# Patient Record
Sex: FEMALE | Race: BLACK OR AFRICAN AMERICAN | Employment: OTHER | URBAN - METROPOLITAN AREA
[De-identification: names, ages, dates, MRNs, and addresses within clinical notes are randomized per-mention and may not be internally consistent; named-entity substitution may affect disease eponyms.]

---

## 2017-02-02 ENCOUNTER — OFFICE VISIT (OUTPATIENT)
Dept: FAMILY MEDICINE CLINIC | Age: 82
End: 2017-02-02

## 2017-02-02 VITALS
DIASTOLIC BLOOD PRESSURE: 70 MMHG | RESPIRATION RATE: 16 BRPM | WEIGHT: 120 LBS | HEART RATE: 76 BPM | HEIGHT: 62 IN | TEMPERATURE: 98.3 F | SYSTOLIC BLOOD PRESSURE: 138 MMHG | BODY MASS INDEX: 22.08 KG/M2

## 2017-02-02 DIAGNOSIS — S29.019A THORACIC MYOFASCIAL STRAIN, INITIAL ENCOUNTER: Primary | ICD-10-CM

## 2017-02-02 DIAGNOSIS — S46.911A SHOULDER STRAIN, RIGHT, INITIAL ENCOUNTER: ICD-10-CM

## 2017-02-02 PROCEDURE — 99213 OFFICE O/P EST LOW 20 MIN: CPT | Performed by: FAMILY MEDICINE

## 2017-02-02 RX ORDER — LEVOTHYROXINE SODIUM 0.07 MG/1
75 TABLET ORAL DAILY
Qty: 90 TABLET | Refills: 1 | Status: SHIPPED | OUTPATIENT
Start: 2017-02-02 | End: 2017-08-02 | Stop reason: SDUPTHER

## 2017-02-13 ENCOUNTER — OFFICE VISIT (OUTPATIENT)
Dept: FAMILY MEDICINE CLINIC | Age: 82
End: 2017-02-13

## 2017-02-13 VITALS
HEART RATE: 68 BPM | DIASTOLIC BLOOD PRESSURE: 78 MMHG | TEMPERATURE: 97.8 F | WEIGHT: 124 LBS | RESPIRATION RATE: 20 BRPM | BODY MASS INDEX: 23.05 KG/M2 | SYSTOLIC BLOOD PRESSURE: 128 MMHG

## 2017-02-13 DIAGNOSIS — N39.41 URGE INCONTINENCE: ICD-10-CM

## 2017-02-13 DIAGNOSIS — I10 ESSENTIAL HYPERTENSION: ICD-10-CM

## 2017-02-13 DIAGNOSIS — E03.9 ACQUIRED HYPOTHYROIDISM: ICD-10-CM

## 2017-02-13 DIAGNOSIS — I69.959 HEMIPLEGIA, LATE EFFECT OF CEREBROVASCULAR DISEASE (HCC): ICD-10-CM

## 2017-02-13 DIAGNOSIS — F03.90 DEMENTIA WITHOUT BEHAVIORAL DISTURBANCE, UNSPECIFIED DEMENTIA TYPE: ICD-10-CM

## 2017-02-13 DIAGNOSIS — E78.5 HYPERLIPIDEMIA WITH TARGET LDL LESS THAN 100: ICD-10-CM

## 2017-02-13 DIAGNOSIS — R73.9 HYPERGLYCEMIA: ICD-10-CM

## 2017-02-13 DIAGNOSIS — Z86.73 HISTORY OF CVA (CEREBROVASCULAR ACCIDENT): ICD-10-CM

## 2017-02-13 DIAGNOSIS — M85.80 OSTEOPENIA: ICD-10-CM

## 2017-02-13 DIAGNOSIS — K21.9 GASTROESOPHAGEAL REFLUX DISEASE WITHOUT ESOPHAGITIS: ICD-10-CM

## 2017-02-13 DIAGNOSIS — Z23 NEED FOR PNEUMOCOCCAL VACCINATION: ICD-10-CM

## 2017-02-13 DIAGNOSIS — M47.816 SPONDYLOSIS OF LUMBAR REGION WITHOUT MYELOPATHY OR RADICULOPATHY: ICD-10-CM

## 2017-02-13 DIAGNOSIS — M51.26 LUMBAR DISC HERNIATION: ICD-10-CM

## 2017-02-13 DIAGNOSIS — Z23 NEEDS FLU SHOT: ICD-10-CM

## 2017-02-13 DIAGNOSIS — Z00.00 MEDICARE ANNUAL WELLNESS VISIT, SUBSEQUENT: Primary | ICD-10-CM

## 2017-02-13 LAB
A/G RATIO: 1.3 (ref 1.1–2.2)
ALBUMIN SERPL-MCNC: 4.1 G/DL (ref 3.4–5)
ALP BLD-CCNC: 51 U/L (ref 40–129)
ALT SERPL-CCNC: 18 U/L (ref 10–40)
ANION GAP SERPL CALCULATED.3IONS-SCNC: 15 MMOL/L (ref 3–16)
AST SERPL-CCNC: 23 U/L (ref 15–37)
BILIRUB SERPL-MCNC: 0.3 MG/DL (ref 0–1)
BUN BLDV-MCNC: 25 MG/DL (ref 7–20)
CALCIUM SERPL-MCNC: 9.7 MG/DL (ref 8.3–10.6)
CHLORIDE BLD-SCNC: 101 MMOL/L (ref 99–110)
CHOLESTEROL, TOTAL: 225 MG/DL (ref 0–199)
CO2: 26 MMOL/L (ref 21–32)
CREAT SERPL-MCNC: 0.8 MG/DL (ref 0.6–1.2)
ESTIMATED AVERAGE GLUCOSE: 116.9 MG/DL
GFR AFRICAN AMERICAN: >60
GFR NON-AFRICAN AMERICAN: >60
GLOBULIN: 3.1 G/DL
GLUCOSE BLD-MCNC: 104 MG/DL (ref 70–99)
HBA1C MFR BLD: 5.7 %
HDLC SERPL-MCNC: 80 MG/DL (ref 40–60)
LDL CHOLESTEROL CALCULATED: 128 MG/DL
POTASSIUM SERPL-SCNC: 4 MMOL/L (ref 3.5–5.1)
SODIUM BLD-SCNC: 142 MMOL/L (ref 136–145)
TOTAL PROTEIN: 7.2 G/DL (ref 6.4–8.2)
TRIGL SERPL-MCNC: 85 MG/DL (ref 0–150)
TSH SERPL DL<=0.05 MIU/L-ACNC: 4.06 UIU/ML (ref 0.27–4.2)
VLDLC SERPL CALC-MCNC: 17 MG/DL

## 2017-02-13 PROCEDURE — 99214 OFFICE O/P EST MOD 30 MIN: CPT | Performed by: FAMILY MEDICINE

## 2017-02-13 PROCEDURE — G0439 PPPS, SUBSEQ VISIT: HCPCS | Performed by: FAMILY MEDICINE

## 2017-02-13 RX ORDER — HYDROCHLOROTHIAZIDE 12.5 MG/1
12.5 CAPSULE, GELATIN COATED ORAL DAILY
Qty: 90 CAPSULE | Refills: 1 | Status: SHIPPED | OUTPATIENT
Start: 2017-02-13 | End: 2017-08-02 | Stop reason: SDUPTHER

## 2017-02-13 RX ORDER — BENAZEPRIL HYDROCHLORIDE 20 MG/1
TABLET ORAL
Qty: 90 TABLET | Refills: 1 | Status: SHIPPED | OUTPATIENT
Start: 2017-02-13 | End: 2017-07-08 | Stop reason: SDUPTHER

## 2017-02-13 ASSESSMENT — ANXIETY QUESTIONNAIRES: GAD7 TOTAL SCORE: 0

## 2017-02-13 ASSESSMENT — LIFESTYLE VARIABLES: HOW OFTEN DO YOU HAVE A DRINK CONTAINING ALCOHOL: 0

## 2017-02-13 ASSESSMENT — PATIENT HEALTH QUESTIONNAIRE - PHQ9: SUM OF ALL RESPONSES TO PHQ QUESTIONS 1-9: 0

## 2017-05-30 RX ORDER — DONEPEZIL HYDROCHLORIDE 10 MG/1
TABLET, FILM COATED ORAL
Qty: 90 TABLET | Refills: 0 | Status: SHIPPED | OUTPATIENT
Start: 2017-05-30 | End: 2017-08-02 | Stop reason: SDUPTHER

## 2017-07-08 DIAGNOSIS — I10 ESSENTIAL HYPERTENSION: ICD-10-CM

## 2017-07-10 RX ORDER — BENAZEPRIL HYDROCHLORIDE 20 MG/1
TABLET ORAL
Qty: 90 TABLET | Refills: 1 | Status: SHIPPED | OUTPATIENT
Start: 2017-07-10 | End: 2018-03-26 | Stop reason: SDUPTHER

## 2017-08-02 ENCOUNTER — OFFICE VISIT (OUTPATIENT)
Dept: FAMILY MEDICINE CLINIC | Age: 82
End: 2017-08-02

## 2017-08-02 VITALS
HEART RATE: 70 BPM | WEIGHT: 125 LBS | DIASTOLIC BLOOD PRESSURE: 70 MMHG | TEMPERATURE: 98.2 F | HEIGHT: 61 IN | RESPIRATION RATE: 16 BRPM | BODY MASS INDEX: 23.6 KG/M2 | SYSTOLIC BLOOD PRESSURE: 134 MMHG

## 2017-08-02 DIAGNOSIS — E03.9 ACQUIRED HYPOTHYROIDISM: ICD-10-CM

## 2017-08-02 DIAGNOSIS — E78.5 HYPERLIPIDEMIA WITH TARGET LDL LESS THAN 100: ICD-10-CM

## 2017-08-02 DIAGNOSIS — Z86.73 HISTORY OF CVA (CEREBROVASCULAR ACCIDENT): ICD-10-CM

## 2017-08-02 DIAGNOSIS — I10 ESSENTIAL HYPERTENSION: Primary | ICD-10-CM

## 2017-08-02 DIAGNOSIS — I69.959 HEMIPLEGIA, LATE EFFECT OF CEREBROVASCULAR DISEASE (HCC): ICD-10-CM

## 2017-08-02 DIAGNOSIS — F03.90 DEMENTIA WITHOUT BEHAVIORAL DISTURBANCE, UNSPECIFIED DEMENTIA TYPE: ICD-10-CM

## 2017-08-02 PROCEDURE — 99214 OFFICE O/P EST MOD 30 MIN: CPT | Performed by: FAMILY MEDICINE

## 2017-08-02 RX ORDER — DONEPEZIL HYDROCHLORIDE 10 MG/1
10 TABLET, FILM COATED ORAL NIGHTLY
Qty: 90 TABLET | Refills: 1 | Status: SHIPPED | OUTPATIENT
Start: 2017-08-02 | End: 2017-08-28

## 2017-08-02 RX ORDER — LEVOTHYROXINE SODIUM 0.07 MG/1
75 TABLET ORAL DAILY
Qty: 90 TABLET | Refills: 1 | Status: SHIPPED | OUTPATIENT
Start: 2017-08-02 | End: 2017-08-28

## 2017-08-02 RX ORDER — HYDROCHLOROTHIAZIDE 12.5 MG/1
12.5 CAPSULE, GELATIN COATED ORAL DAILY
Qty: 90 CAPSULE | Refills: 1 | Status: SHIPPED | OUTPATIENT
Start: 2017-08-02 | End: 2018-01-23 | Stop reason: SDUPTHER

## 2017-08-28 RX ORDER — LEVOTHYROXINE SODIUM 0.07 MG/1
TABLET ORAL
Qty: 90 TABLET | Refills: 1 | Status: SHIPPED | OUTPATIENT
Start: 2017-08-28 | End: 2018-01-24

## 2017-09-19 ENCOUNTER — OFFICE VISIT (OUTPATIENT)
Dept: FAMILY MEDICINE CLINIC | Age: 82
End: 2017-09-19

## 2017-09-19 VITALS
HEART RATE: 82 BPM | SYSTOLIC BLOOD PRESSURE: 110 MMHG | TEMPERATURE: 98.2 F | BODY MASS INDEX: 23.62 KG/M2 | RESPIRATION RATE: 12 BRPM | DIASTOLIC BLOOD PRESSURE: 60 MMHG | WEIGHT: 125 LBS

## 2017-09-19 DIAGNOSIS — N39.0 URINARY TRACT INFECTION WITHOUT HEMATURIA, SITE UNSPECIFIED: ICD-10-CM

## 2017-09-19 DIAGNOSIS — R35.0 URINARY FREQUENCY: Primary | ICD-10-CM

## 2017-09-19 LAB
BILIRUBIN, POC: NEGATIVE
BLOOD URINE, POC: ABNORMAL
CLARITY, POC: CLEAR
COLOR, POC: ABNORMAL
GLUCOSE URINE, POC: NEGATIVE
KETONES, POC: NEGATIVE
LEUKOCYTE EST, POC: ABNORMAL
NITRITE, POC: NEGATIVE
PH, POC: 5.5
PROTEIN, POC: NEGATIVE
SPECIFIC GRAVITY, POC: 1.02
UROBILINOGEN, POC: 0.2

## 2017-09-19 PROCEDURE — 81002 URINALYSIS NONAUTO W/O SCOPE: CPT | Performed by: NURSE PRACTITIONER

## 2017-09-19 PROCEDURE — 99213 OFFICE O/P EST LOW 20 MIN: CPT | Performed by: NURSE PRACTITIONER

## 2017-09-19 RX ORDER — CIPROFLOXACIN 500 MG/1
500 TABLET, FILM COATED ORAL 2 TIMES DAILY
Qty: 20 TABLET | Refills: 0 | Status: SHIPPED | OUTPATIENT
Start: 2017-09-19 | End: 2017-09-29

## 2017-09-19 ASSESSMENT — ENCOUNTER SYMPTOMS
DIARRHEA: 0
NAUSEA: 0
VOMITING: 0
SHORTNESS OF BREATH: 0
ABDOMINAL PAIN: 0
COUGH: 0

## 2017-09-21 LAB
ORGANISM: ABNORMAL
URINE CULTURE, ROUTINE: ABNORMAL
URINE CULTURE, ROUTINE: ABNORMAL

## 2017-11-17 ENCOUNTER — TELEPHONE (OUTPATIENT)
Dept: FAMILY MEDICINE CLINIC | Age: 82
End: 2017-11-17

## 2017-11-17 DIAGNOSIS — I69.959 HEMIPLEGIA, LATE EFFECT OF CEREBROVASCULAR DISEASE (HCC): Primary | ICD-10-CM

## 2017-11-17 DIAGNOSIS — M47.816 SPONDYLOSIS OF LUMBAR REGION WITHOUT MYELOPATHY OR RADICULOPATHY: ICD-10-CM

## 2017-11-17 NOTE — TELEPHONE ENCOUNTER
DOP is calling to see if Dr. Simin Lane can order a bed from [facility] with the mattress that goes on top of the mattress. Please advise. Thanks.

## 2017-11-17 NOTE — TELEPHONE ENCOUNTER
To clarify  Is this rx for the entire bed, including mattress topper?   DOP states she needs bed as well

## 2017-12-08 ENCOUNTER — TELEPHONE (OUTPATIENT)
Dept: FAMILY MEDICINE CLINIC | Age: 82
End: 2017-12-08

## 2017-12-11 ENCOUNTER — OFFICE VISIT (OUTPATIENT)
Dept: FAMILY MEDICINE CLINIC | Age: 82
End: 2017-12-11

## 2017-12-11 VITALS
RESPIRATION RATE: 16 BRPM | HEART RATE: 72 BPM | WEIGHT: 130 LBS | HEIGHT: 61 IN | SYSTOLIC BLOOD PRESSURE: 132 MMHG | DIASTOLIC BLOOD PRESSURE: 70 MMHG | BODY MASS INDEX: 24.55 KG/M2 | TEMPERATURE: 98.1 F

## 2017-12-11 DIAGNOSIS — R35.0 URINARY FREQUENCY: Primary | ICD-10-CM

## 2017-12-11 LAB
BILIRUBIN, POC: NEGATIVE
BLOOD URINE, POC: NEGATIVE
CLARITY, POC: CLEAR
COLOR, POC: YELLOW
GLUCOSE URINE, POC: NEGATIVE
KETONES, POC: NEGATIVE
LEUKOCYTE EST, POC: ABNORMAL
NITRITE, POC: NEGATIVE
PH, POC: 5.5
PROTEIN, POC: NEGATIVE
SPECIFIC GRAVITY, POC: 1.02
UROBILINOGEN, POC: ABNORMAL

## 2017-12-11 PROCEDURE — 4040F PNEUMOC VAC/ADMIN/RCVD: CPT | Performed by: FAMILY MEDICINE

## 2017-12-11 PROCEDURE — 1036F TOBACCO NON-USER: CPT | Performed by: FAMILY MEDICINE

## 2017-12-11 PROCEDURE — G8484 FLU IMMUNIZE NO ADMIN: HCPCS | Performed by: FAMILY MEDICINE

## 2017-12-11 PROCEDURE — G8427 DOCREV CUR MEDS BY ELIG CLIN: HCPCS | Performed by: FAMILY MEDICINE

## 2017-12-11 PROCEDURE — 1090F PRES/ABSN URINE INCON ASSESS: CPT | Performed by: FAMILY MEDICINE

## 2017-12-11 PROCEDURE — 81002 URINALYSIS NONAUTO W/O SCOPE: CPT | Performed by: FAMILY MEDICINE

## 2017-12-11 PROCEDURE — 99213 OFFICE O/P EST LOW 20 MIN: CPT | Performed by: FAMILY MEDICINE

## 2017-12-11 PROCEDURE — G8420 CALC BMI NORM PARAMETERS: HCPCS | Performed by: FAMILY MEDICINE

## 2017-12-11 PROCEDURE — 1123F ACP DISCUSS/DSCN MKR DOCD: CPT | Performed by: FAMILY MEDICINE

## 2017-12-11 RX ORDER — TOLTERODINE 4 MG/1
4 CAPSULE, EXTENDED RELEASE ORAL DAILY
Qty: 30 CAPSULE | Refills: 3 | Status: SHIPPED | OUTPATIENT
Start: 2017-12-11 | End: 2019-06-17

## 2017-12-11 NOTE — PROGRESS NOTES
CHART REVIEW  Health Maintenance   Topic Date Due    Pneumococcal low/med risk (2 of 2 - PCV13) 06/23/2010    Flu vaccine (1) 08/01/2018 (Originally 9/1/2017)    DTaP/Tdap/Td vaccine (2 - Td) 08/13/2018    Zostavax vaccine  Addressed     Social History     Social History    Marital status:      Spouse name: N/A    Number of children: N/A    Years of education: N/A     Occupational History    retired       Social History Main Topics    Smoking status: Never Smoker    Smokeless tobacco: Never Used      Comment: congrats    Alcohol use No    Drug use: No    Sexual activity: Not Currently     Other Topics Concern    None     Social History Narrative    Self-breast exams: yes    Exercise: stretching daily    Seatbelt use: Always    Living will: yes,   copy requested    Sexual activity: none       Prior to Visit Medications    Medication Sig Taking? Authorizing Provider   tolterodine (DETROL LA) 4 MG extended release capsule Take 1 capsule by mouth daily Yes Nubia Velasquez MD   Misc. Devices (MATTRESS PAD) MISC Egg Crate Yes Nubia Velasquez MD   levothyroxine (SYNTHROID) 75 MCG tablet TAKE 1 TABLET BY MOUTH DAILY Yes Nubia Velasquez MD   donepezil (ARICEPT) 10 MG tablet TAKE 1 TABLET NIGHTLY Yes Nubia Velasquez MD   hydrochlorothiazide (MICROZIDE) 12.5 MG capsule Take 1 capsule by mouth daily Yes Nubia Velasquez MD   benazepril (LOTENSIN) 20 MG tablet TAKE 1 TABLET EVERY DAY Yes Nubia Velasquez MD   Calcium Carb-Cholecalciferol (CALCIUM 500/D) 500-400 MG-UNIT CHEW Take 1 tablet by mouth daily. Yes Nubia Velasquez MD   Multiple Minerals-Vitamins (MIKE-MAG-ZINC-D) TABS Take 1 tablet by mouth. Yes Nubia Velasquez MD   cetirizine (ZYRTEC) 10 MG tablet Take 10 mg by mouth daily. Yes Historical Provider, MD   aspirin 325 MG tablet Take 325 mg by mouth daily. Yes Historical Provider, MD   dorzolamide-timolol (COSOPT) 22.3-6.8 MG/ML ophthalmic solution 1 drop 2 times daily.  Dr. Rick Rodarte - [facility] Yes Historical Provider, MD

## 2017-12-11 NOTE — PATIENT INSTRUCTIONS
Our office will call with urine culture results. START Detrol once a day to reduce urinary frequency. Keep drinking water. Patient Education        Urge Incontinence in Women: Care Instructions  Your Care Instructions    Urge incontinence occurs when the need to urinate is so strong that you cannot reach the toilet in time, even when your bladder contains only a small amount of urine. This is also called overactive bladder or unstable bladder. Some women may have no warning before they leak urine. This condition does not cause major health problems. But it can be embarrassing and can affect a woman's self-esteem and confidence. Treatment can cure or improve your symptoms. Follow-up care is a key part of your treatment and safety. Be sure to make and go to all appointments, and call your doctor if you are having problems. It's also a good idea to know your test results and keep a list of the medicines you take. How can you care for yourself at home? · Be safe with medicines. Take your medicines exactly as prescribed. Call your doctor if you think you are having a problem with your medicine. You will get more details on the specific medicines your doctor prescribes. · Limit caffeine and alcohol. They stimulate urine production. · Urinate every 2 to 4 hours during waking hours, even if you feel that you do not have to go. · Do pelvic floor (Kegel) exercises, which tighten and strengthen pelvic muscles. To do Kegel exercises:  ¨ Squeeze the same muscles you would use to stop your urine. Your belly and thighs should not move. ¨ Hold the squeeze for 3 seconds, then relax for 3 seconds. ¨ Start with 3 seconds. Then add 1 second each week until you are able to squeeze for 10 seconds. ¨ Repeat the exercise 10 to 15 times for each session. Do three or more sessions each day. · Try wearing pads that absorb the leaks. · Keep skin in the genital area dry. When should you call for help?   Call your doctor now or seek immediate medical care if:  ? · You have new urinary symptoms. These may include leaking urine, having pain when urinating, or feeling like you need to urinate often. ? Watch closely for changes in your health, and be sure to contact your doctor if:  ? · You do not get better as expected. Where can you learn more? Go to [email or web address]. org and sign in to your [employer] account. Enter C937 in the CuÃ­date box to learn more about \"Urge Incontinence in Women: Care Instructions. \"     If you do not have an account, please click on the \"Sign Up Now\" link. Current as of: October 13, 2016  Content Version: 11.4  © 4803-7174 Healthwise, Incorporated. Care instructions adapted under license by [facility] ([facility]). If you have questions about a medical condition or this instruction, always ask your healthcare professional. [address] any warranty or liability for your use of this information.

## 2017-12-12 LAB — URINE CULTURE, ROUTINE: NORMAL

## 2017-12-29 ENCOUNTER — TELEPHONE (OUTPATIENT)
Dept: FAMILY MEDICINE CLINIC | Age: 82
End: 2017-12-29

## 2017-12-29 DIAGNOSIS — M47.816 SPONDYLOSIS OF LUMBAR REGION WITHOUT MYELOPATHY OR RADICULOPATHY: ICD-10-CM

## 2017-12-29 DIAGNOSIS — M47.812 OSTEOARTHRITIS OF CERVICAL SPINE, UNSPECIFIED SPINAL OSTEOARTHRITIS COMPLICATION STATUS: ICD-10-CM

## 2017-12-29 DIAGNOSIS — Z87.81 HISTORY OF COMPRESSION FRACTURE OF SPINE: ICD-10-CM

## 2017-12-29 DIAGNOSIS — M51.26 LUMBAR DISC HERNIATION: ICD-10-CM

## 2017-12-29 DIAGNOSIS — I69.959 HEMIPLEGIA, LATE EFFECT OF CEREBROVASCULAR DISEASE (HCC): Primary | ICD-10-CM

## 2017-12-29 NOTE — TELEPHONE ENCOUNTER
Tejal Knight is calling from [employer] to get the pt a hospital bed with a Eggcrate mattress topper to go with the bed. Tejal Knight said that the bed can be order through [facility] and this will be covered by Medicare. If for whatever reason that Medicare will not pay for the bed then [facility] will Medicaid waiver will look into this matter. Can we please faxed the required order to [facility] also stated that please be aware that the pt has BB. Fax. [address]ida [address] advise,    Thanks.

## 2018-01-23 DIAGNOSIS — E03.9 ACQUIRED HYPOTHYROIDISM: ICD-10-CM

## 2018-01-23 DIAGNOSIS — I10 ESSENTIAL HYPERTENSION: ICD-10-CM

## 2018-01-24 RX ORDER — LEVOTHYROXINE SODIUM 0.07 MG/1
75 TABLET ORAL DAILY
Qty: 90 TABLET | Refills: 1 | Status: SHIPPED | OUTPATIENT
Start: 2018-01-24 | End: 2018-07-21 | Stop reason: SDUPTHER

## 2018-01-24 RX ORDER — HYDROCHLOROTHIAZIDE 12.5 MG/1
12.5 CAPSULE, GELATIN COATED ORAL DAILY
Qty: 90 CAPSULE | Refills: 1 | Status: SHIPPED | OUTPATIENT
Start: 2018-01-24 | End: 2018-07-21 | Stop reason: SDUPTHER

## 2018-02-01 ENCOUNTER — TELEPHONE (OUTPATIENT)
Dept: FAMILY MEDICINE CLINIC | Age: 83
End: 2018-02-01

## 2018-02-01 DIAGNOSIS — I10 ESSENTIAL HYPERTENSION: Primary | ICD-10-CM

## 2018-02-01 DIAGNOSIS — E78.5 HYPERLIPIDEMIA WITH TARGET LDL LESS THAN 100: ICD-10-CM

## 2018-02-01 DIAGNOSIS — I69.959 HEMIPLEGIA, LATE EFFECT OF CEREBROVASCULAR DISEASE (HCC): ICD-10-CM

## 2018-02-01 DIAGNOSIS — E03.9 ACQUIRED HYPOTHYROIDISM: ICD-10-CM

## 2018-02-01 DIAGNOSIS — Z86.73 HISTORY OF CVA (CEREBROVASCULAR ACCIDENT): ICD-10-CM

## 2018-02-01 DIAGNOSIS — R73.03 PREDIABETES: ICD-10-CM

## 2018-02-15 DIAGNOSIS — Z86.73 HISTORY OF CVA (CEREBROVASCULAR ACCIDENT): ICD-10-CM

## 2018-02-15 DIAGNOSIS — E78.5 HYPERLIPIDEMIA WITH TARGET LDL LESS THAN 100: ICD-10-CM

## 2018-02-15 DIAGNOSIS — I10 ESSENTIAL HYPERTENSION: ICD-10-CM

## 2018-02-15 DIAGNOSIS — R73.03 PREDIABETES: ICD-10-CM

## 2018-02-15 DIAGNOSIS — E03.9 ACQUIRED HYPOTHYROIDISM: ICD-10-CM

## 2018-02-15 LAB
A/G RATIO: 1.6 (ref 1.1–2.2)
ALBUMIN SERPL-MCNC: 4.3 G/DL (ref 3.4–5)
ALP BLD-CCNC: 51 U/L (ref 40–129)
ALT SERPL-CCNC: 25 U/L (ref 10–40)
ANION GAP SERPL CALCULATED.3IONS-SCNC: 11 MMOL/L (ref 3–16)
AST SERPL-CCNC: 25 U/L (ref 15–37)
BASOPHILS ABSOLUTE: 0.1 K/UL (ref 0–0.2)
BASOPHILS RELATIVE PERCENT: 1.4 %
BILIRUB SERPL-MCNC: 0.4 MG/DL (ref 0–1)
BUN BLDV-MCNC: 22 MG/DL (ref 7–20)
CALCIUM SERPL-MCNC: 10.2 MG/DL (ref 8.3–10.6)
CHLORIDE BLD-SCNC: 104 MMOL/L (ref 99–110)
CHOLESTEROL, TOTAL: 223 MG/DL (ref 0–199)
CO2: 30 MMOL/L (ref 21–32)
CREAT SERPL-MCNC: 0.9 MG/DL (ref 0.6–1.2)
EOSINOPHILS ABSOLUTE: 0.1 K/UL (ref 0–0.6)
EOSINOPHILS RELATIVE PERCENT: 2.3 %
ESTIMATED AVERAGE GLUCOSE: 116.9 MG/DL
GFR AFRICAN AMERICAN: >60
GFR NON-AFRICAN AMERICAN: 59
GLOBULIN: 2.7 G/DL
GLUCOSE BLD-MCNC: 98 MG/DL (ref 70–99)
HBA1C MFR BLD: 5.7 %
HCT VFR BLD CALC: 36.6 % (ref 36–48)
HDLC SERPL-MCNC: 75 MG/DL (ref 40–60)
HEMOGLOBIN: 12.3 G/DL (ref 12–16)
LDL CHOLESTEROL CALCULATED: 128 MG/DL
LYMPHOCYTES ABSOLUTE: 2.5 K/UL (ref 1–5.1)
LYMPHOCYTES RELATIVE PERCENT: 47.1 %
MCH RBC QN AUTO: 31.5 PG (ref 26–34)
MCHC RBC AUTO-ENTMCNC: 33.6 G/DL (ref 31–36)
MCV RBC AUTO: 93.7 FL (ref 80–100)
MONOCYTES ABSOLUTE: 0.4 K/UL (ref 0–1.3)
MONOCYTES RELATIVE PERCENT: 8.2 %
NEUTROPHILS ABSOLUTE: 2.2 K/UL (ref 1.7–7.7)
NEUTROPHILS RELATIVE PERCENT: 41 %
PDW BLD-RTO: 14.4 % (ref 12.4–15.4)
PLATELET # BLD: 278 K/UL (ref 135–450)
PMV BLD AUTO: 9.5 FL (ref 5–10.5)
POTASSIUM SERPL-SCNC: 4.2 MMOL/L (ref 3.5–5.1)
RBC # BLD: 3.9 M/UL (ref 4–5.2)
SODIUM BLD-SCNC: 145 MMOL/L (ref 136–145)
TOTAL PROTEIN: 7 G/DL (ref 6.4–8.2)
TRIGL SERPL-MCNC: 100 MG/DL (ref 0–150)
TSH SERPL DL<=0.05 MIU/L-ACNC: 2.23 UIU/ML (ref 0.27–4.2)
VLDLC SERPL CALC-MCNC: 20 MG/DL
WBC # BLD: 5.4 K/UL (ref 4–11)

## 2018-02-19 ENCOUNTER — OFFICE VISIT (OUTPATIENT)
Dept: FAMILY MEDICINE CLINIC | Age: 83
End: 2018-02-19

## 2018-02-19 VITALS
BODY MASS INDEX: 24.35 KG/M2 | RESPIRATION RATE: 18 BRPM | WEIGHT: 129 LBS | SYSTOLIC BLOOD PRESSURE: 122 MMHG | HEART RATE: 74 BPM | TEMPERATURE: 98.5 F | HEIGHT: 61 IN | DIASTOLIC BLOOD PRESSURE: 70 MMHG

## 2018-02-19 DIAGNOSIS — H33.20 RETINAL DETACHMENT, UNSPECIFIED LATERALITY: ICD-10-CM

## 2018-02-19 DIAGNOSIS — K21.9 GASTROESOPHAGEAL REFLUX DISEASE WITHOUT ESOPHAGITIS: ICD-10-CM

## 2018-02-19 DIAGNOSIS — M47.816 SPONDYLOSIS OF LUMBAR REGION WITHOUT MYELOPATHY OR RADICULOPATHY: ICD-10-CM

## 2018-02-19 DIAGNOSIS — Z00.00 ROUTINE GENERAL MEDICAL EXAMINATION AT A HEALTH CARE FACILITY: Primary | ICD-10-CM

## 2018-02-19 DIAGNOSIS — R73.03 PREDIABETES: ICD-10-CM

## 2018-02-19 DIAGNOSIS — M51.26 LUMBAR DISC HERNIATION: ICD-10-CM

## 2018-02-19 DIAGNOSIS — F03.90 DEMENTIA WITHOUT BEHAVIORAL DISTURBANCE, UNSPECIFIED DEMENTIA TYPE: ICD-10-CM

## 2018-02-19 DIAGNOSIS — Z23 NEED FOR PROPHYLACTIC VACCINATION AGAINST STREPTOCOCCUS PNEUMONIAE (PNEUMOCOCCUS): ICD-10-CM

## 2018-02-19 DIAGNOSIS — I69.959 HEMIPLEGIA, LATE EFFECT OF CEREBROVASCULAR DISEASE (HCC): ICD-10-CM

## 2018-02-19 DIAGNOSIS — Z86.73 HISTORY OF CVA (CEREBROVASCULAR ACCIDENT): ICD-10-CM

## 2018-02-19 DIAGNOSIS — M21.371 RIGHT FOOT DROP: ICD-10-CM

## 2018-02-19 DIAGNOSIS — I10 ESSENTIAL HYPERTENSION: ICD-10-CM

## 2018-02-19 DIAGNOSIS — Z91.81 AT HIGH RISK FOR FALLS: ICD-10-CM

## 2018-02-19 DIAGNOSIS — E03.9 ACQUIRED HYPOTHYROIDISM: ICD-10-CM

## 2018-02-19 DIAGNOSIS — E78.5 HYPERLIPIDEMIA WITH TARGET LDL LESS THAN 100: ICD-10-CM

## 2018-02-19 PROCEDURE — G8484 FLU IMMUNIZE NO ADMIN: HCPCS | Performed by: FAMILY MEDICINE

## 2018-02-19 PROCEDURE — 1036F TOBACCO NON-USER: CPT | Performed by: FAMILY MEDICINE

## 2018-02-19 PROCEDURE — 1123F ACP DISCUSS/DSCN MKR DOCD: CPT | Performed by: FAMILY MEDICINE

## 2018-02-19 PROCEDURE — G8427 DOCREV CUR MEDS BY ELIG CLIN: HCPCS | Performed by: FAMILY MEDICINE

## 2018-02-19 PROCEDURE — G8420 CALC BMI NORM PARAMETERS: HCPCS | Performed by: FAMILY MEDICINE

## 2018-02-19 PROCEDURE — G0439 PPPS, SUBSEQ VISIT: HCPCS | Performed by: FAMILY MEDICINE

## 2018-02-19 PROCEDURE — 99214 OFFICE O/P EST MOD 30 MIN: CPT | Performed by: FAMILY MEDICINE

## 2018-02-19 PROCEDURE — 4040F PNEUMOC VAC/ADMIN/RCVD: CPT | Performed by: FAMILY MEDICINE

## 2018-02-19 PROCEDURE — 1090F PRES/ABSN URINE INCON ASSESS: CPT | Performed by: FAMILY MEDICINE

## 2018-02-19 ASSESSMENT — ANXIETY QUESTIONNAIRES: GAD7 TOTAL SCORE: 0

## 2018-02-19 ASSESSMENT — PATIENT HEALTH QUESTIONNAIRE - PHQ9: SUM OF ALL RESPONSES TO PHQ QUESTIONS 1-9: 0

## 2018-02-19 ASSESSMENT — LIFESTYLE VARIABLES: HOW OFTEN DO YOU HAVE A DRINK CONTAINING ALCOHOL: 0

## 2018-02-19 NOTE — PATIENT INSTRUCTIONS
INSTRUCTIONS  NEXT APPOINTMENT: Please schedule check-up in 6 months. · PLEASE TAKE THIS FORM TO CHECK-OUT WINDOW TO SCHEDULE NEXT VISIT.   · REFILL POLICY:  If not getting refills today, then PLEASE make next appointment on way out today. Will need to see that future appointment scheudled when rubén contacts Dr. Yane Mueller for a refill. Patient Education       Personalized Preventive Plan for Trish Parada - 2/19/2018  Medicare offers a range of preventive health benefits. Some of the tests and screenings are paid in full while other may be subject to a deductible, co-insurance, and/or copay. Some of these benefits include a comprehensive review of your medical history including lifestyle, illnesses that may run in your family, and various assessments and screenings as appropriate. After reviewing your medical record and screening and assessments performed today your provider may have ordered immunizations, labs, imaging, and/or referrals for you. A list of these orders (if applicable) as well as your Preventive Care list are included within your After Visit Summary for your review. Other Preventive Recommendations:    · A preventive eye exam performed by an eye specialist is recommended every 1-2 years to screen for glaucoma; cataracts, macular degeneration, and other eye disorders. · A preventive dental visit is recommended every 6 months. · Try to get at least 150 minutes of exercise per week or 10,000 steps per day on a pedometer . · Order or download the FREE \"Exercise & Physical Activity: Your Everyday Guide\" from [employer]. Call 3-638.127.3105 or search [employer] online. · You need 7357-0608 mg of calcium and 1006-6434 IU of vitamin D per day.  It is possible to meet your calcium requirement with diet alone, but a vitamin D supplement is usually necessary to meet this goal.  · When exposed to the sun, use a sunscreen that protects against

## 2018-02-19 NOTE — PROGRESS NOTES
Medicare Annual Wellness Visit  Name: Delilah Yuan Date: 2018   MRN: [id number] Sex: Female   Age: 80 y.o. Ethnicity: Non-/Non    : 1931 Race: Black      Rossana Hunt is here for Medicare AWV    Chief Complaint:   Rossana Hunt is a 80 y.o. female who presents for Medicare Annual Wellness Visit and check-up for:  3. Essential hypertension    4. Hyperlipidemia with target LDL less than 100    5. History of CVA (cerebrovascular accident)    6. Acquired hypothyroidism    7. Hemiplegia, late effect of cerebrovascular disease ([facility] [address]    8. Gastroesophageal reflux disease without esophagitis    9. Dementia without behavioral disturbance, unspecified dementia type    10. Prediabetes    11. Retinal detachment- has stabilized     ALSO, F2F for hospital bed below.     HPI  Some hearing trouble, pt not want aid  Some chair exercise  Has assist with bathing, laundry, cleaning, cooking    Review of Systems   Review of Systems - General ROS: negative for - fever or night sweats  Ophthalmic ROS: negative for - eye pain  ENT ROS: negative for - hearing change or sore throat  Hematological and Lymphatic ROS: negative for - bruising or weight loss  Endocrine ROS: negative for - malaise/lethargy or unexpected weight changes  Respiratory ROS: no cough, shortness of breath, or wheezing  Cardiovascular ROS: no chest pain or dyspnea on exertion  Gastrointestinal ROS: no abdominal pain, change in bowel habits, or black or bloody stools  Genito-Urinary ROS: no dysuria, trouble voiding, or hematuria; urgency unchanged  Dermatological ROS: negative for - rash or skin lesion changes    LAST LABS   Lab Results   Component Value Date    GLUCOSE 98 02/15/2018     Lab Results   Component Value Date     02/15/2018    K 4.2 02/15/2018    CREATININE 0.9 02/15/2018     Cholesterol, Total   Date Value Ref Range Status   02/15/2018 223 (H) 0 - 199 mg/dL Final     LDL Calculated   Date Value Ref Range Status   02/15/2018 128 (H) <100 mg/dL Final     HDL   Date Value Ref Range Status   02/15/2018 75 (H) 40 - 60 mg/dL Final   05/03/2012 49 40 - 60 mg/dl Final     Triglycerides   Date Value Ref Range Status   02/15/2018 100 0 - 150 mg/dL Final     Lab Results   Component Value Date    ALT 25 02/15/2018    AST 25 02/15/2018    ALKPHOS 51 02/15/2018    BILITOT 0.4 02/15/2018      Lab Results   Component Value Date    WBC 5.4 02/15/2018    HGB 12.3 02/15/2018    HCT 36.6 02/15/2018    MCV 93.7 02/15/2018     02/15/2018     TSH (uIU/mL)   Date Value   02/15/2018 2.23     Lab Results   Component Value Date    LABA1C 5.7 02/15/2018     The ASCVD Risk score (Lucio Berman, et al., 2013) failed to calculate for the following reasons: The 2013 ASCVD risk score is only valid for ages 36 to 78    Screenings for behavioral, psychosocial and functional/safety risks, and cognitive dysfunction are all negative except as indicated below. These results, as well as other patient data from the [facility] form, are documented in Flowsheets linked to this Encounter. Allergies   Allergen Reactions    Bactrim [Sulfamethoxazole-Trimethoprim]     Reglan [Metoclopramide Hcl]        Prior to Visit Medications    Medication Sig Taking? Authorizing Provider   hydrochlorothiazide (MICROZIDE) 12.5 MG capsule TAKE 1 CAPSULE BY MOUTH DAILY Yes Dave Gaffney MD   levothyroxine (SYNTHROID) 75 MCG tablet TAKE 1 TABLET BY MOUTH DAILY Yes Dave Gaffney MD   [place] [facility] [place] Does not apply route With egg crate mattress Yes Dave Gaffney MD   tolterodine (DETROL LA) 4 MG extended release capsule Take 1 capsule by mouth daily Yes Dave Gaffney MD   [facility]  Devices (MATTRESS PAD) MISC Egg Crate Yes Dave Gaffney MD   donepezil (ARICEPT) 10 MG tablet TAKE 1 TABLET NIGHTLY Yes Dave Gaffney MD   benazepril (LOTENSIN) 20 MG tablet TAKE 1 TABLET EVERY DAY Yes Dave Gaffney MD   Calcium Carb-Cholecalciferol (CALCIUM 500/D) driving, watching TV, or doing any of your daily activities because of your eyesight?: No  Have you had an eye exam within the past year?: Yes  Hearing/Vision Interventions:  · Hearing concerns:  patient declines any further evaluation/treatment for hearing issues    ADL:  ADLs  In the past 7 days, did you need help from others to perform any of the following everyday activities?: (!) Bathing  In the past 7 days, did you need help from others to take care of any of the following?: [employer], Housekeeping, [employer], Shopping, Food Preparation, Transportation, Taking Medications  ADL Interventions:  · family managing fine      Personalized Preventive Plan   Current Health Maintenance Status  Immunization History   Administered Date(s) Administered    Pneumococcal Conjugate 7-valent 06/23/2009    Pneumococcal Polysaccharide ([id number]) 06/23/2009    Tdap (Boostrix, Adacel) 08/13/2008        Health Maintenance   Topic Date Due    Pneumococcal low/med risk (2 of 2 - PCV13) 06/23/2010    Flu vaccine (1) 08/01/2018 (Originally 9/1/2017)    DTaP/Tdap/Td vaccine (2 - Td) 08/13/2018    TSH testing  02/15/2019    Potassium monitoring  02/15/2019    Creatinine monitoring  02/15/2019    Zostavax vaccine  Addressed     Recommendations for Preventive Services Due: see orders. Recommended screening schedule for the next 5-10 years is provided to the patient in written form: see Patient Instructions/AVS.     Assessment and Plan:     1. Routine general medical examination at a health care facility     2. Need for prophylactic vaccination against Streptococcus pneumoniae (pneumococcus)  Pneumococcal conjugate vaccine 13-valent PCV13   3. Essential hypertension  Good control. Current treatment plan is effective, continue same. 4. Hyperlipidemia with target LDL less than 100  Last test showed control to be well controlled. No significant medication side effects noted. Continue current therapy.  Labs as below.     5. History of CVA (cerebrovascular accident)     6. Acquired hypothyroidism  Assessment: last check showed current treatment plan is effective. No symptoms. Plan:  Lab orders and follow up as documented in EMR     7. Hemiplegia, late effect of cerebrovascular disease (HCC)  High fall risk, ambulates slowly with cane    8. Gastroesophageal reflux disease without esophagitis  Stable with current medications. 9. Dementia without behavioral disturbance, unspecified dementia type  Very mild   10. Prediabetes  stable   11. Retinal detachment, unspecified laterality  Per optho   12. At high risk for falls     13. Right foot drop     14. Lumbar disc herniation  Pain manageable   15. Spondylosis of lumbar region without myelopathy or radiculopathy  Pain manageable     RISK FACTORS AND COUNSELLING  · Behavioral Risks- :\"None identified\". Counseling provided on the following healthy behaviors: N/A. INSTRUCTIONS  NEXT APPOINTMENT: Please schedule check-up in 6 months. · PLEASE TAKE THIS FORM TO CHECK-OUT WINDOW TO SCHEDULE NEXT VISIT.   · REFILL POLICY:  If not getting refills today, then PLEASE make next appointment on way out today. Will need to see that future appointment scheudled when pharamcy contacts Dr. Corey Tidwell for a refill.     Located within [facility] TO [facility] BED  · High risk for fall getting in and out of bed; risk of falls out of bed itself  · Uses cane at home  · R foot drop  · Chronic low back pain

## 2018-03-26 DIAGNOSIS — I10 ESSENTIAL HYPERTENSION: ICD-10-CM

## 2018-03-27 RX ORDER — BENAZEPRIL HYDROCHLORIDE 20 MG/1
TABLET ORAL
Qty: 90 TABLET | Refills: 1 | Status: SHIPPED | OUTPATIENT
Start: 2018-03-27 | End: 2018-09-18 | Stop reason: SDUPTHER

## 2018-04-16 RX ORDER — DONEPEZIL HYDROCHLORIDE 10 MG/1
TABLET, FILM COATED ORAL
Qty: 90 TABLET | Refills: 1 | Status: SHIPPED | OUTPATIENT
Start: 2018-04-16 | End: 2018-10-12 | Stop reason: SDUPTHER

## 2018-06-27 ENCOUNTER — TELEPHONE (OUTPATIENT)
Dept: FAMILY MEDICINE CLINIC | Age: 83
End: 2018-06-27

## 2018-07-09 ENCOUNTER — OFFICE VISIT (OUTPATIENT)
Dept: FAMILY MEDICINE CLINIC | Age: 83
End: 2018-07-09

## 2018-07-09 VITALS
BODY MASS INDEX: 24.55 KG/M2 | RESPIRATION RATE: 16 BRPM | SYSTOLIC BLOOD PRESSURE: 138 MMHG | HEART RATE: 90 BPM | DIASTOLIC BLOOD PRESSURE: 68 MMHG | HEIGHT: 61 IN | TEMPERATURE: 98.4 F | WEIGHT: 130 LBS

## 2018-07-09 DIAGNOSIS — L72.3 INFECTED SEBACEOUS CYST: Primary | ICD-10-CM

## 2018-07-09 DIAGNOSIS — L08.9 INFECTED SEBACEOUS CYST: Primary | ICD-10-CM

## 2018-07-09 PROCEDURE — 4040F PNEUMOC VAC/ADMIN/RCVD: CPT | Performed by: FAMILY MEDICINE

## 2018-07-09 PROCEDURE — G8420 CALC BMI NORM PARAMETERS: HCPCS | Performed by: FAMILY MEDICINE

## 2018-07-09 PROCEDURE — 1090F PRES/ABSN URINE INCON ASSESS: CPT | Performed by: FAMILY MEDICINE

## 2018-07-09 PROCEDURE — G8427 DOCREV CUR MEDS BY ELIG CLIN: HCPCS | Performed by: FAMILY MEDICINE

## 2018-07-09 PROCEDURE — 99213 OFFICE O/P EST LOW 20 MIN: CPT | Performed by: FAMILY MEDICINE

## 2018-07-09 PROCEDURE — 1123F ACP DISCUSS/DSCN MKR DOCD: CPT | Performed by: FAMILY MEDICINE

## 2018-07-09 PROCEDURE — 1036F TOBACCO NON-USER: CPT | Performed by: FAMILY MEDICINE

## 2018-07-09 RX ORDER — CLINDAMYCIN HYDROCHLORIDE 300 MG/1
300 CAPSULE ORAL 3 TIMES DAILY
Qty: 30 CAPSULE | Refills: 0 | Status: SHIPPED | OUTPATIENT
Start: 2018-07-09 | End: 2018-07-19

## 2018-07-09 NOTE — PROGRESS NOTES
Laurent Damian is a 80 y.o. female who presents open infected wound of the right shoulder blade. Duration 5 days   Associated with redness,pain, yellowish drainage  Denies injury, fever,   Tried cleaned with peroxide, antibiotic ointment and a Band-Aid. Patient's medications, allergies, past medical, surgical, social and family histories were reviewed and updated as appropriate (see above)    Review of Systems   General ROS: fever? No,    Night sweats? No  Endocrine ROS:malaise/lethargy? No   Unexpected weight changes? No  Respiratory ROS: cough? No   Shortness of breath? No  Cardiovascular ROS:chest pain? No   Shortness of breath with exertion? No  Gastrointestinal ROS: abdominal pain? No   Change in stools? No  Genito-Urinary ROS: painful urination? No   Trouble voiding? No  Neurological ROS: TIA or stroke symptoms? No   Numbness/tingling in feet? No     Objective:    PHYSICAL EXAM   /68 (Site: Left Arm, Position: Sitting, Cuff Size: Small Adult)   Pulse 90   Temp 98.4 °F (36.9 °C) (Oral)   Resp 16   Ht 5' 1\" (1.549 m)   Wt 130 lb (59 kg)   BMI 24.56 kg/m²   Blood pressure is Good. BP Readings from Last 5 Encounters:   07/09/18 138/68   02/19/18 122/70   12/11/17 132/70   09/19/17 110/60   08/02/17 134/70     Weight is unchanged. Wt Readings from Last 5 Encounters:   07/09/18 130 lb (59 kg)   02/19/18 129 lb (58.5 kg)   12/11/17 130 lb (59 kg)   09/19/17 125 lb (56.7 kg)   08/02/17 125 lb (56.7 kg)      GENERAL:   · well-developed, well-nourished, alert, no distress. SKIN: warm and dry  Left upper back with 8 mm openning with pus and surrounding erythema. Pus and sebaceous material expressed with minor discomfort. Bactroban and dressing applied     Assessment and Plan:      Diagnosis Orders   1. Infected sebaceous cyst  clindamycin (CLEOCIN) 300 MG capsule    mupirocin (BACTROBAN) 2 % ointment       INSTRUCTIONS  · Please finish taking ALL of the antibiotics.    · Wash with mild soap and

## 2018-07-09 NOTE — PATIENT INSTRUCTIONS
and your doctor is likely to postpone treating them until the inflammation subsides. Rupture. A ruptured cyst often leads to a boil-like abscess that requires prompt treatment. Infection. An epidermoid cyst can become infected spontaneously or after a rupture. Genital discomfort. Genital epidermoid cysts can lead to painful intercourse and urination. What you can do in the meantime  Resist the urge to try to squeeze or \"pop\" your cyst. Your doctor will be able to take care of the cyst with the least risk of scarring and infection. Tests and diagnosis  In most cases, your doctor can diagnose an epidermoid cyst based on its appearance,   though he or she may refer you to a dermatologist for treatment. Treatments and drugs  Cysts that don't cause cosmetic or functional problems are usually left alone. A cyst that's inflamed, ruptured or infected may be treated with: Injections. Your doctor may inject an inflamed, but uninfected, epidermoid cyst with a corticosteroid or triamcinolone acetonide (Kenalog) to help reduce inflammation. Incision and drainage. In this procedure, your doctor makes a small cut in the cyst and expresses the contents. Although incision and drainage is relatively quick and easy, cysts often recur after this treatment. Total excision. This surgical technique removes the entire cyst and so prevents recurrence. Excision is most effective when the cyst isn't inflamed. Your doctor may recommend first treating the inflammation with antibiotics, steroids, or incision and drainage and then waiting to perform excision for four to six weeks after inflammation resolves. Total excision requires sutures. Your doctor will remove sutures in your face within a week or so of total cyst excision, and will remove sutures elsewhere in your body within one to two weeks. Minimal excision.  Some doctors prefer this technique because it removes the whole cyst wall but causes minimal, if any,

## 2018-07-21 DIAGNOSIS — E03.9 ACQUIRED HYPOTHYROIDISM: ICD-10-CM

## 2018-07-21 DIAGNOSIS — I10 ESSENTIAL HYPERTENSION: ICD-10-CM

## 2018-07-23 RX ORDER — HYDROCHLOROTHIAZIDE 12.5 MG/1
12.5 CAPSULE, GELATIN COATED ORAL DAILY
Qty: 90 CAPSULE | Refills: 1 | Status: SHIPPED | OUTPATIENT
Start: 2018-07-23 | End: 2019-01-16 | Stop reason: SDUPTHER

## 2018-07-23 RX ORDER — LEVOTHYROXINE SODIUM 0.07 MG/1
75 TABLET ORAL DAILY
Qty: 90 TABLET | Refills: 1 | Status: SHIPPED | OUTPATIENT
Start: 2018-07-23 | End: 2019-01-16 | Stop reason: SDUPTHER

## 2018-07-26 ENCOUNTER — TELEPHONE (OUTPATIENT)
Dept: FAMILY MEDICINE CLINIC | Age: 83
End: 2018-07-26

## 2018-07-30 ENCOUNTER — TELEPHONE (OUTPATIENT)
Dept: FAMILY MEDICINE CLINIC | Age: 83
End: 2018-07-30

## 2018-08-14 ENCOUNTER — TELEPHONE (OUTPATIENT)
Dept: FAMILY MEDICINE CLINIC | Age: 83
End: 2018-08-14

## 2018-08-14 NOTE — TELEPHONE ENCOUNTER
Daughter calling saying that she is still having issues with a open wound on back - not healing - wasn't sure if she needs to bring her in or not? Dr. Cinthia Maciel saw it before but it's not healing - oozing but not necessarily infected based off observation.      # [phone]

## 2018-08-21 ENCOUNTER — OFFICE VISIT (OUTPATIENT)
Dept: FAMILY MEDICINE CLINIC | Age: 83
End: 2018-08-21

## 2018-08-21 VITALS
TEMPERATURE: 98.1 F | HEART RATE: 76 BPM | WEIGHT: 131 LBS | DIASTOLIC BLOOD PRESSURE: 70 MMHG | OXYGEN SATURATION: 98 % | HEIGHT: 61 IN | BODY MASS INDEX: 24.73 KG/M2 | SYSTOLIC BLOOD PRESSURE: 130 MMHG | RESPIRATION RATE: 16 BRPM

## 2018-08-21 DIAGNOSIS — I69.351 FLACCID HEMIPLEGIA OF RIGHT DOMINANT SIDE AS LATE EFFECT OF CEREBRAL INFARCTION (HCC): Primary | ICD-10-CM

## 2018-08-21 DIAGNOSIS — Z91.81 AT HIGH RISK FOR FALLS: ICD-10-CM

## 2018-08-21 DIAGNOSIS — L08.9 INFECTED SEBACEOUS CYST: ICD-10-CM

## 2018-08-21 DIAGNOSIS — L72.3 INFECTED SEBACEOUS CYST: ICD-10-CM

## 2018-08-21 DIAGNOSIS — I10 ESSENTIAL HYPERTENSION: ICD-10-CM

## 2018-08-21 DIAGNOSIS — M21.371 RIGHT FOOT DROP: ICD-10-CM

## 2018-08-21 PROCEDURE — 1036F TOBACCO NON-USER: CPT | Performed by: FAMILY MEDICINE

## 2018-08-21 PROCEDURE — 99213 OFFICE O/P EST LOW 20 MIN: CPT | Performed by: FAMILY MEDICINE

## 2018-08-21 PROCEDURE — 4040F PNEUMOC VAC/ADMIN/RCVD: CPT | Performed by: FAMILY MEDICINE

## 2018-08-21 PROCEDURE — G8420 CALC BMI NORM PARAMETERS: HCPCS | Performed by: FAMILY MEDICINE

## 2018-08-21 PROCEDURE — 1101F PT FALLS ASSESS-DOCD LE1/YR: CPT | Performed by: FAMILY MEDICINE

## 2018-08-21 PROCEDURE — G8427 DOCREV CUR MEDS BY ELIG CLIN: HCPCS | Performed by: FAMILY MEDICINE

## 2018-08-21 PROCEDURE — 1090F PRES/ABSN URINE INCON ASSESS: CPT | Performed by: FAMILY MEDICINE

## 2018-08-21 PROCEDURE — 1123F ACP DISCUSS/DSCN MKR DOCD: CPT | Performed by: FAMILY MEDICINE

## 2018-08-21 NOTE — PROGRESS NOTES
Conjunctivitis    Screening colonoscopy- neg 6/09, stop due age   Mason Schwab History of CVA (cerebrovascular accident)    Urge incontinence    Osteoarthritis of lumbar spine    History of compression fracture of spine    Osteoarthritis of spine    Lumbar disc herniation    Well adult health check    Dementia without behavioral disturbance    Urinary frequency    Needs flu shot- declines    Need for pneumococcal vaccination    Prediabetes    At high risk for falls    Right foot drop     Cholesterol, Total   Date Value Ref Range Status   02/15/2018 223 (H) 0 - 199 mg/dL Final     LDL Calculated   Date Value Ref Range Status   02/15/2018 128 (H) <100 mg/dL Final     HDL   Date Value Ref Range Status   02/15/2018 75 (H) 40 - 60 mg/dL Final   05/03/2012 49 40 - 60 mg/dl Final     Triglycerides   Date Value Ref Range Status   02/15/2018 100 0 - 150 mg/dL Final     Lab Results   Component Value Date    GLUCOSE 98 02/15/2018     Lab Results   Component Value Date     02/15/2018    K 4.2 02/15/2018    CREATININE 0.9 02/15/2018     Lab Results   Component Value Date    WBC 5.4 02/15/2018    HGB 12.3 02/15/2018    HCT 36.6 02/15/2018    MCV 93.7 02/15/2018     02/15/2018     Lab Results   Component Value Date    ALT 25 02/15/2018    AST 25 02/15/2018    ALKPHOS 51 02/15/2018    BILITOT 0.4 02/15/2018     TSH (uIU/mL)   Date Value   02/15/2018 2.23     Lab Results   Component Value Date    LABA1C 5.7 02/15/2018     Current Outpatient Prescriptions   Medication Sig Dispense Refill    levothyroxine (SYNTHROID) 75 MCG tablet TAKE 1 TABLET BY MOUTH DAILY 90 tablet 1    hydrochlorothiazide (MICROZIDE) 12.5 MG capsule TAKE 1 CAPSULE BY MOUTH DAILY 90 capsule 1    donepezil (ARICEPT) 10 MG tablet TAKE 1 TABLET NIGHTLY 90 tablet 1    benazepril (LOTENSIN) 20 MG tablet TAKE 1 TABLET EVERY DAY 90 tablet 1   [address] by Does not apply route With egg crate mattress 1 each 0    tolterodine (DETROL LA) 4 MG extended release capsule Take 1 capsule by mouth daily 30 capsule 3    Misc. Devices (MATTRESS PAD) MISC Egg Crate 1 each 0    Calcium Carb-Cholecalciferol (CALCIUM 500/D) 500-400 MG-UNIT CHEW Take 1 tablet by mouth daily.  Multiple Minerals-Vitamins (MIKE-MAG-ZINC-D) TABS Take 1 tablet by mouth.  cetirizine (ZYRTEC) 10 MG tablet Take 10 mg by mouth daily.  aspirin 325 MG tablet Take 325 mg by mouth daily.  dorzolamide-timolol (COSOPT) 22.3-6.8 MG/ML ophthalmic solution 1 drop 2 times daily. Dr. Salty HOLDER       No current facility-administered medications for this visit. Review of Systems   General ROS: fever? No,    Night sweats? No  Endocrine ROS:malaise/lethargy? No   Unexpected weight changes? No  Respiratory ROS: cough? No   Shortness of breath? No  Cardiovascular ROS:chest pain? No   Shortness of breath with exertion? No  Gastrointestinal ROS: abdominal pain? No   Change in stools? No  Genito-Urinary ROS: painful urination? No   Trouble voiding? No  Neurological ROS: TIA or stroke symptoms? No   Numbness/tingling in feet? No     Objective:    PHYSICAL EXAM   /70 (Site: Right Arm, Position: Sitting, Cuff Size: Medium Adult)   Pulse 76   Temp 98.1 °F (36.7 °C) (Oral)   Resp 16   Ht 5' 1\" (1.549 m)   Wt 131 lb (59.4 kg)   SpO2 98%   BMI 24.75 kg/m²   Blood pressure is Excellent. BP Readings from Last 5 Encounters:   08/21/18 130/70   07/09/18 138/68   02/19/18 122/70   12/11/17 132/70   09/19/17 110/60     Weight is unchanged. Wt Readings from Last 5 Encounters:   08/21/18 131 lb (59.4 kg)   07/09/18 130 lb (59 kg)   02/19/18 129 lb (58.5 kg)   12/11/17 130 lb (59 kg)   09/19/17 125 lb (56.7 kg)      GENERAL:   · well-developed, well-nourished, wheelchair bound, alert, no distress.      SKIN: warm and dry  · R upper back still with 1 cm open area into dermis, small amount of sebaceous material expressed  MUSCULOSKEL:    · In WC  · No significant finger or nail

## 2018-10-12 RX ORDER — DONEPEZIL HYDROCHLORIDE 10 MG/1
TABLET, FILM COATED ORAL
Qty: 90 TABLET | Refills: 1 | Status: SHIPPED | OUTPATIENT
Start: 2018-10-12 | End: 2019-04-15 | Stop reason: SDUPTHER

## 2018-11-14 ENCOUNTER — TELEPHONE (OUTPATIENT)
Dept: FAMILY MEDICINE CLINIC | Age: 83
End: 2018-11-14

## 2018-11-20 ENCOUNTER — TELEPHONE (OUTPATIENT)
Dept: FAMILY MEDICINE CLINIC | Age: 83
End: 2018-11-20

## 2018-11-20 ENCOUNTER — OFFICE VISIT (OUTPATIENT)
Dept: FAMILY MEDICINE CLINIC | Age: 83
End: 2018-11-20
Payer: COMMERCIAL

## 2018-11-20 VITALS
OXYGEN SATURATION: 97 % | HEIGHT: 61 IN | TEMPERATURE: 98.5 F | DIASTOLIC BLOOD PRESSURE: 60 MMHG | SYSTOLIC BLOOD PRESSURE: 128 MMHG | BODY MASS INDEX: 25.11 KG/M2 | RESPIRATION RATE: 16 BRPM | HEART RATE: 76 BPM | WEIGHT: 133 LBS

## 2018-11-20 DIAGNOSIS — N63.10 BREAST MASS, RIGHT: Primary | ICD-10-CM

## 2018-11-20 DIAGNOSIS — N64.52 BLOODY DISCHARGE FROM RIGHT NIPPLE: ICD-10-CM

## 2018-11-20 DIAGNOSIS — D49.3 NEOPLASM OF BREAST: ICD-10-CM

## 2018-11-20 PROCEDURE — G8427 DOCREV CUR MEDS BY ELIG CLIN: HCPCS | Performed by: FAMILY MEDICINE

## 2018-11-20 PROCEDURE — G8484 FLU IMMUNIZE NO ADMIN: HCPCS | Performed by: FAMILY MEDICINE

## 2018-11-20 PROCEDURE — 1036F TOBACCO NON-USER: CPT | Performed by: FAMILY MEDICINE

## 2018-11-20 PROCEDURE — 1123F ACP DISCUSS/DSCN MKR DOCD: CPT | Performed by: FAMILY MEDICINE

## 2018-11-20 PROCEDURE — 99214 OFFICE O/P EST MOD 30 MIN: CPT | Performed by: FAMILY MEDICINE

## 2018-11-20 PROCEDURE — 1090F PRES/ABSN URINE INCON ASSESS: CPT | Performed by: FAMILY MEDICINE

## 2018-11-20 PROCEDURE — G8419 CALC BMI OUT NRM PARAM NOF/U: HCPCS | Performed by: FAMILY MEDICINE

## 2018-11-20 PROCEDURE — 1101F PT FALLS ASSESS-DOCD LE1/YR: CPT | Performed by: FAMILY MEDICINE

## 2018-11-20 PROCEDURE — 4040F PNEUMOC VAC/ADMIN/RCVD: CPT | Performed by: FAMILY MEDICINE

## 2018-11-20 RX ORDER — CETIRIZINE HYDROCHLORIDE 10 MG/1
10 TABLET ORAL
COMMUNITY
Start: 2018-07-17 | End: 2018-11-20

## 2018-11-20 NOTE — PATIENT INSTRUCTIONS
INSTRUCTIONS  · Probably breast cancer. · See breast specialist to discuss biopsy. · May be calling to schedule imaging before that visit. Patient Education     BREAST CANCER    Overview   What is breast cancer? Breast cancer begins in breast tissue. Most of the tumors that develop in breast tissue are benign (not cancerous). Some breast tumors are cancerous, but have not yet spread to other parts of the body. This type of breast cancer is called \"in situ,\" and it can almost always be cured with treatment. The most serious type of breast cancer is invasive, meaning that the cancerous tumors have spread to other parts of the body. Breast cancer is the second most common cancer among women (after skin cancer). The good news is that the rate of death from breast cancer has declined over the last few years. This is probably because more tumors have been found early, when treatment can help the most. Regular screening mammograms and breast exams (both self-exams and exams by a doctor) can help find breast cancers early. Causes & Risk Factors   What causes breast cancer? It is not known exactly what causes breast cancer, but there are certain risk factors that seem to increase a person's chance of getting the disease. It's estimated that about 10% of breast cancer cases are hereditary (run in the family). In many of these cases, a person has inherited a gene from his or her parents that has mutated (changed from its normal form). This mutated gene makes it more likely for a person to get breast cancer. What genes can cause breast cancer to be inherited? Everyone has two genes called BRCA1 and BRCA2. Normally, these genes help to prevent cancer tumors from growing. But sometimes a person inherits an abnormal (mutated) form of BRCA1 or BRCA2 from his or her family. This person's chance of getting breast cancer increases.  Women from [address] families are more likely than other women to carry abnormal BRCA1 and BRCA2. Mutations in these genes have also been linked to ovarian cancer. Besides BRCA1 and BRCA2, there are other mutated genes that may make it more likely for a person to get breast cancer. Scientists know about some of these genes, and they are working to identify others. What clues in my family history might show I've inherited a risk of breast cancer? Breast cancer in 2 or more first-degree relatives is a sign that the mutated form of BRCA1 or BRCA2 might run in your family. First-degree relatives include your parents, siblings and children. Another sign of a risk of inherited breast cancer is a first-degree relative who got breast cancer before the age of 48. If you have a first-degree relative with ovarian cancer for example, that might also mean that you risk carrying one of the mutated genes. Does everyone who has family members with breast cancer have these mutated genes? No. The chances of inheriting breast cancer aren't high, even if someone in your family has had the disease. Many people have parents, siblings or children who have had breast cancer without carrying a mutated form of BRCA1 or BRCA2. Although anyone with first-degree relatives who have had breast cancer is at increased risk, most people don't get the inherited kind of breast cancer. Breast cancer seems to run in my family. What should I do? Talk with your doctor about your family history. For example, your doctor will want to know how you are related to any family members who have had breast cancer. Your doctor will also want to know how old your relatives were when their breast cancer was diagnosed. Should I have a test to find out if I carry the breast cancer gene? The choice is up to you and your doctor. Your doctor can help you decide if a gene test might be useful to you. He or she can also discuss the pros and cons of taking the test. Talking with a genetic counselor might also be helpful.   Think about how X-ray can be taken. The X-ray takes 1 or 2 minutes, and the entire process usually takes no more than about 20 minutes. Do mammograms hurt? Mammograms can be uncomfortable. But they don't take very long. You may find that planning to have your mammogram shortly after your period makes it less uncomfortable. Your breasts may be less tender at this time. How often should I get a mammogram?  Women 48years of age and older should get a mammogram every 2 years. If you have risk factors for breast cancer, such as a family history of breast cancer, your doctor may want you to have mammograms more often or start having them sooner. When should I talk to my doctor about a change in my breast?  Although there is no evidence that breast self-exams can help prevent breast cancer, they might help you get familiar with how your breasts normally feel so you may more easily notice any changes. Talk to your doctor if you notice any of the changes listed below.     Changes to look for in your breasts  Any new lump (which may or may not be painful or tender)   Unusual thickening of your breasts   Sticky or bloody discharge from your nipples   Any changes in the skin of your nipples or breasts, such as puckering or dimpling   An unusual increase in the size of one breast   One breast unusually lower than the other

## 2018-11-20 NOTE — PROGRESS NOTES
tolterodine (DETROL LA) 4 MG extended release capsule Take 1 capsule by mouth daily Yes Shereen Mccall MD   Misc. Devices (MATTRESS PAD) MISC Egg Crate Yes Shereen Mccall MD   Calcium Carb-Cholecalciferol (CALCIUM 500/D) 500-400 MG-UNIT CHEW Take 1 tablet by mouth daily. Yes Shereen Mccall MD   Multiple Minerals-Vitamins (MIKE-MAG-ZINC-D) TABS Take 1 tablet by mouth. Yes Shereen Mccall MD   cetirizine (ZYRTEC) 10 MG tablet Take 10 mg by mouth daily. Yes Historical Provider, MD   aspirin 325 MG tablet Take 325 mg by mouth daily. Yes Historical Provider, MD   dorzolamide-timolol (COSOPT) 22.3-6.8 MG/ML ophthalmic solution 1 drop 2 times daily.  Dr. Karli Osorio - [facility] Yes Historical Provider, MD       Patient Active Problem List   Diagnosis    HTN (hypertension)    Hypothyroidism    Hemiplegia, late effect of cerebrovascular disease ([facility] [address]- R leg foot drop    Osteopenia- DXA -2.2 (7/14), -1.5 (4/11), -1.8 (8/08)    GERD (gastroesophageal reflux disease)    Chronic low back pain    Spondylosis, cervical    Aortic sclerosis- 2/6 KANCHAN, [place] ECHO    Glaucoma    Retinal detachment    Constipation    Hyperlipidemia with target LDL less than 100    Allergic Conjunctivitis    History of CVA (cerebrovascular accident)    Urge incontinence    Osteoarthritis of lumbar spine    History of compression fracture of spine    Osteoarthritis of spine    Lumbar disc herniation    Dementia without behavioral disturbance    Urinary frequency    Needs flu shot- declines    Need for pneumococcal vaccination    Prediabetes    At high risk for falls    Right foot drop      Past Medical History:   Diagnosis Date    Aortic sclerosis- 2/6 KANCHAN, [place] ECHO     Chronic low back pain     Cystocele without mention of uterine prolapse     Gastritis     GERD (gastroesophageal reflux disease)     Glaucoma     HTN (hypertension)     Hyperlipidemia LDL goal < 100     Hypothyroidism     Spondylosis, cervical     Unspecified cataract monitoring  02/15/2019      Patient Active Problem List   Diagnosis    HTN (hypertension)    Hypothyroidism    Hemiplegia, late effect of cerebrovascular disease ([facility] [address]- R leg foot drop    Osteopenia- DXA -2.2 (7/14), -1.5 (4/11), -1.8 (8/08)    GERD (gastroesophageal reflux disease)    Chronic low back pain    Spondylosis, cervical    Aortic sclerosis- 2/6 KANCHAN, [place] ECHO    Glaucoma    Retinal detachment    Constipation    Hyperlipidemia with target LDL less than 100    Allergic Conjunctivitis    History of CVA (cerebrovascular accident)    Urge incontinence    Osteoarthritis of lumbar spine    History of compression fracture of spine    Osteoarthritis of spine    Lumbar disc herniation    Dementia without behavioral disturbance    Urinary frequency    Needs flu shot- declines    Need for pneumococcal vaccination    Prediabetes    At high risk for falls    Right foot drop     Cholesterol, Total   Date Value Ref Range Status   02/15/2018 223 (H) 0 - 199 mg/dL Final     LDL Calculated   Date Value Ref Range Status   02/15/2018 128 (H) <100 mg/dL Final     HDL   Date Value Ref Range Status   02/15/2018 75 (H) 40 - 60 mg/dL Final   05/03/2012 49 40 - 60 mg/dl Final     Triglycerides   Date Value Ref Range Status   02/15/2018 100 0 - 150 mg/dL Final     Lab Results   Component Value Date    GLUCOSE 98 02/15/2018     Lab Results   Component Value Date     02/15/2018    K 4.2 02/15/2018    CREATININE 0.9 02/15/2018     Lab Results   Component Value Date    WBC 5.4 02/15/2018    HGB 12.3 02/15/2018    HCT 36.6 02/15/2018    MCV 93.7 02/15/2018     02/15/2018     Lab Results   Component Value Date    ALT 25 02/15/2018    AST 25 02/15/2018    ALKPHOS 51 02/15/2018    BILITOT 0.4 02/15/2018     TSH (uIU/mL)   Date Value   02/15/2018 2.23     Lab Results   Component Value Date    LABA1C 5.7 02/15/2018     Current Outpatient Prescriptions   Medication Sig Dispense Refill    donepezil (ARICEPT) 10 MG tablet TAKE 1 TABLET NIGHTLY 90 tablet 1    benazepril (LOTENSIN) 20 MG tablet TAKE 1 TABLET EVERY DAY 90 tablet 0    levothyroxine (SYNTHROID) 75 MCG tablet TAKE 1 TABLET BY MOUTH DAILY 90 tablet 1    hydrochlorothiazide (MICROZIDE) 12.5 MG capsule TAKE 1 CAPSULE BY MOUTH DAILY 90 capsule 1    Hospital Bed MISC by Does not apply route With egg crate mattress 1 each 0    tolterodine (DETROL LA) 4 MG extended release capsule Take 1 capsule by mouth daily 30 capsule 3    Misc. Devices (MATTRESS PAD) MISC Egg Crate 1 each 0    Calcium Carb-Cholecalciferol (CALCIUM 500/D) 500-400 MG-UNIT CHEW Take 1 tablet by mouth daily.  Multiple Minerals-Vitamins (MIKE-MAG-ZINC-D) TABS Take 1 tablet by mouth.  cetirizine (ZYRTEC) 10 MG tablet Take 10 mg by mouth daily.  aspirin 325 MG tablet Take 325 mg by mouth daily.  dorzolamide-timolol (COSOPT) 22.3-6.8 MG/ML ophthalmic solution 1 drop 2 times daily. Dr. Messi HOLDER       No current facility-administered medications for this visit. Review of Systems   General ROS: fever? No,    Night sweats? No  Endocrine ROS:malaise/lethargy? Yes   Unexpected weight changes? No  Respiratory ROS: cough? No   Shortness of breath? No  Cardiovascular ROS:chest pain? No   Shortness of breath with exertion? No  Gastrointestinal ROS: abdominal pain? No   Change in stools? No  Genito-Urinary ROS: painful urination? No   Trouble voiding? No  Neurological ROS: TIA or stroke symptoms? No   Numbness/tingling in feet? No     Objective:    PHYSICAL EXAM   /60 (Site: Left Upper Arm, Position: Sitting, Cuff Size: Medium Adult)   Pulse 76   Temp 98.5 °F (36.9 °C) (Oral)   Resp 16   Ht 5' 1\" (1.549 m)   Wt 133 lb (60.3 kg)   SpO2 97%   BMI 25.13 kg/m²   Blood pressure is Good. BP Readings from Last 5 Encounters:   11/20/18 128/60   08/21/18 130/70   07/09/18 138/68   02/19/18 122/70   12/11/17 132/70     Weight is unchanged.    Wt Readings from Last 5 Encounters:   11/20/18 133 lb (60.3 kg)   08/21/18 131 lb (59.4 kg)   07/09/18 130 lb (59 kg)   02/19/18 129 lb (58.5 kg)   12/11/17 130 lb (59 kg)      GENERAL:   · well-developed, well-nourished, alert, no distress, in WC. BREAST:    No skin changes, normal symmetry  · R large mass lower outer, bloody nipple discharge  · L unremarkable  LYMPH:  · no axillary nodes   SKIN:  No rashes. Assessment and Plan:      Diagnosis Orders   1. Breast mass, right  Ambulatory referral to Breast Clinic   2. Bloody discharge from right nipple  Ambulatory referral to Breast Clinic   Previous labs reviewed as above. INSTRUCTIONS  · Probably breast cancer. · See breast specialist to discuss biopsy. · May be calling to schedule imaging before that visit.

## 2018-11-21 ENCOUNTER — CASE MANAGEMENT (OUTPATIENT)
Dept: WOMENS IMAGING | Age: 83
End: 2018-11-21

## 2018-11-21 NOTE — PROGRESS NOTES
Informed Cindi Cano, pt daughter, that I have Cindi Cano scheduled for breast imaging and possible biopsy on 12. 7.18 at 1240. Cindi Cano verbalized understanding and took our contact information.

## 2018-12-11 ENCOUNTER — CASE MANAGEMENT (OUTPATIENT)
Dept: WOMENS IMAGING | Age: 83
End: 2018-12-11

## 2018-12-12 ENCOUNTER — TELEPHONE (OUTPATIENT)
Dept: FAMILY MEDICINE CLINIC | Age: 83
End: 2018-12-12

## 2018-12-16 DIAGNOSIS — I10 ESSENTIAL HYPERTENSION: ICD-10-CM

## 2018-12-17 RX ORDER — BENAZEPRIL HYDROCHLORIDE 20 MG/1
TABLET ORAL
Qty: 90 TABLET | Refills: 0 | Status: SHIPPED | OUTPATIENT
Start: 2018-12-17 | End: 2019-03-19 | Stop reason: SDUPTHER

## 2019-01-16 DIAGNOSIS — E03.9 ACQUIRED HYPOTHYROIDISM: ICD-10-CM

## 2019-01-16 DIAGNOSIS — I10 ESSENTIAL HYPERTENSION: ICD-10-CM

## 2019-01-17 RX ORDER — HYDROCHLOROTHIAZIDE 12.5 MG/1
12.5 CAPSULE, GELATIN COATED ORAL DAILY
Qty: 90 CAPSULE | Refills: 1 | Status: SHIPPED | OUTPATIENT
Start: 2019-01-17 | End: 2019-06-17 | Stop reason: SDUPTHER

## 2019-01-17 RX ORDER — LEVOTHYROXINE SODIUM 0.07 MG/1
75 TABLET ORAL DAILY
Qty: 90 TABLET | Refills: 1 | Status: SHIPPED | OUTPATIENT
Start: 2019-01-17 | End: 2019-06-17 | Stop reason: SDUPTHER

## 2019-02-26 ENCOUNTER — OFFICE VISIT (OUTPATIENT)
Dept: FAMILY MEDICINE CLINIC | Age: 84
End: 2019-02-26
Payer: COMMERCIAL

## 2019-02-26 VITALS
WEIGHT: 129 LBS | HEART RATE: 97 BPM | BODY MASS INDEX: 24.35 KG/M2 | TEMPERATURE: 97.7 F | RESPIRATION RATE: 16 BRPM | SYSTOLIC BLOOD PRESSURE: 118 MMHG | DIASTOLIC BLOOD PRESSURE: 78 MMHG | HEIGHT: 61 IN

## 2019-02-26 DIAGNOSIS — R73.03 PREDIABETES: ICD-10-CM

## 2019-02-26 DIAGNOSIS — I69.351 FLACCID HEMIPLEGIA OF RIGHT DOMINANT SIDE AS LATE EFFECT OF CEREBRAL INFARCTION (HCC): ICD-10-CM

## 2019-02-26 DIAGNOSIS — E78.5 HYPERLIPIDEMIA WITH TARGET LDL LESS THAN 100: ICD-10-CM

## 2019-02-26 DIAGNOSIS — E03.9 ACQUIRED HYPOTHYROIDISM: ICD-10-CM

## 2019-02-26 DIAGNOSIS — I10 ESSENTIAL HYPERTENSION: ICD-10-CM

## 2019-02-26 DIAGNOSIS — I10 ESSENTIAL HYPERTENSION: Primary | ICD-10-CM

## 2019-02-26 LAB
A/G RATIO: 1.2 (ref 1.1–2.2)
ALBUMIN SERPL-MCNC: 4 G/DL (ref 3.4–5)
ALP BLD-CCNC: 53 U/L (ref 40–129)
ALT SERPL-CCNC: 16 U/L (ref 10–40)
ANION GAP SERPL CALCULATED.3IONS-SCNC: 15 MMOL/L (ref 3–16)
AST SERPL-CCNC: 20 U/L (ref 15–37)
BILIRUB SERPL-MCNC: 0.4 MG/DL (ref 0–1)
BUN BLDV-MCNC: 22 MG/DL (ref 7–20)
CALCIUM SERPL-MCNC: 10.2 MG/DL (ref 8.3–10.6)
CHLORIDE BLD-SCNC: 100 MMOL/L (ref 99–110)
CHOLESTEROL, TOTAL: 211 MG/DL (ref 0–199)
CO2: 28 MMOL/L (ref 21–32)
CREAT SERPL-MCNC: 0.9 MG/DL (ref 0.6–1.2)
GFR AFRICAN AMERICAN: >60
GFR NON-AFRICAN AMERICAN: 59
GLOBULIN: 3.3 G/DL
GLUCOSE BLD-MCNC: 101 MG/DL (ref 70–99)
HDLC SERPL-MCNC: 63 MG/DL (ref 40–60)
LDL CHOLESTEROL CALCULATED: 123 MG/DL
POTASSIUM SERPL-SCNC: 4.2 MMOL/L (ref 3.5–5.1)
SODIUM BLD-SCNC: 143 MMOL/L (ref 136–145)
TOTAL PROTEIN: 7.3 G/DL (ref 6.4–8.2)
TRIGL SERPL-MCNC: 123 MG/DL (ref 0–150)
TSH SERPL DL<=0.05 MIU/L-ACNC: 1.3 UIU/ML (ref 0.27–4.2)
VLDLC SERPL CALC-MCNC: 25 MG/DL

## 2019-02-26 PROCEDURE — 99214 OFFICE O/P EST MOD 30 MIN: CPT | Performed by: FAMILY MEDICINE

## 2019-02-26 PROCEDURE — 4040F PNEUMOC VAC/ADMIN/RCVD: CPT | Performed by: FAMILY MEDICINE

## 2019-02-26 PROCEDURE — G8484 FLU IMMUNIZE NO ADMIN: HCPCS | Performed by: FAMILY MEDICINE

## 2019-02-26 PROCEDURE — 1036F TOBACCO NON-USER: CPT | Performed by: FAMILY MEDICINE

## 2019-02-26 PROCEDURE — 1101F PT FALLS ASSESS-DOCD LE1/YR: CPT | Performed by: FAMILY MEDICINE

## 2019-02-26 PROCEDURE — G8420 CALC BMI NORM PARAMETERS: HCPCS | Performed by: FAMILY MEDICINE

## 2019-02-26 PROCEDURE — 1123F ACP DISCUSS/DSCN MKR DOCD: CPT | Performed by: FAMILY MEDICINE

## 2019-02-26 PROCEDURE — 1090F PRES/ABSN URINE INCON ASSESS: CPT | Performed by: FAMILY MEDICINE

## 2019-02-26 PROCEDURE — G8427 DOCREV CUR MEDS BY ELIG CLIN: HCPCS | Performed by: FAMILY MEDICINE

## 2019-02-26 ASSESSMENT — PATIENT HEALTH QUESTIONNAIRE - PHQ9
SUM OF ALL RESPONSES TO PHQ QUESTIONS 1-9: 0
2. FEELING DOWN, DEPRESSED OR HOPELESS: 0
SUM OF ALL RESPONSES TO PHQ QUESTIONS 1-9: 0
1. LITTLE INTEREST OR PLEASURE IN DOING THINGS: 0
SUM OF ALL RESPONSES TO PHQ9 QUESTIONS 1 & 2: 0

## 2019-02-27 LAB
ESTIMATED AVERAGE GLUCOSE: 114 MG/DL
HBA1C MFR BLD: 5.6 %

## 2019-03-19 DIAGNOSIS — I10 ESSENTIAL HYPERTENSION: ICD-10-CM

## 2019-03-19 RX ORDER — BENAZEPRIL HYDROCHLORIDE 20 MG/1
TABLET ORAL
Qty: 90 TABLET | Refills: 0 | Status: SHIPPED | OUTPATIENT
Start: 2019-03-19 | End: 2019-06-17 | Stop reason: SDUPTHER

## 2019-06-15 DIAGNOSIS — I10 ESSENTIAL HYPERTENSION: ICD-10-CM

## 2019-06-17 ENCOUNTER — OFFICE VISIT (OUTPATIENT)
Dept: FAMILY MEDICINE CLINIC | Age: 84
End: 2019-06-17
Payer: COMMERCIAL

## 2019-06-17 VITALS
HEART RATE: 80 BPM | HEIGHT: 61 IN | TEMPERATURE: 97.7 F | RESPIRATION RATE: 18 BRPM | SYSTOLIC BLOOD PRESSURE: 128 MMHG | WEIGHT: 126 LBS | DIASTOLIC BLOOD PRESSURE: 60 MMHG | BODY MASS INDEX: 23.79 KG/M2

## 2019-06-17 DIAGNOSIS — M21.371 RIGHT FOOT DROP: ICD-10-CM

## 2019-06-17 DIAGNOSIS — Z91.81 AT HIGH RISK FOR FALLS: ICD-10-CM

## 2019-06-17 DIAGNOSIS — I10 ESSENTIAL HYPERTENSION: ICD-10-CM

## 2019-06-17 DIAGNOSIS — I69.351 FLACCID HEMIPLEGIA OF RIGHT DOMINANT SIDE AS LATE EFFECT OF CEREBRAL INFARCTION (HCC): Primary | ICD-10-CM

## 2019-06-17 DIAGNOSIS — F03.90 DEMENTIA WITHOUT BEHAVIORAL DISTURBANCE, UNSPECIFIED DEMENTIA TYPE: ICD-10-CM

## 2019-06-17 DIAGNOSIS — E03.9 ACQUIRED HYPOTHYROIDISM: ICD-10-CM

## 2019-06-17 PROCEDURE — 99213 OFFICE O/P EST LOW 20 MIN: CPT | Performed by: FAMILY MEDICINE

## 2019-06-17 PROCEDURE — G8427 DOCREV CUR MEDS BY ELIG CLIN: HCPCS | Performed by: FAMILY MEDICINE

## 2019-06-17 PROCEDURE — G8420 CALC BMI NORM PARAMETERS: HCPCS | Performed by: FAMILY MEDICINE

## 2019-06-17 PROCEDURE — 4040F PNEUMOC VAC/ADMIN/RCVD: CPT | Performed by: FAMILY MEDICINE

## 2019-06-17 PROCEDURE — 1123F ACP DISCUSS/DSCN MKR DOCD: CPT | Performed by: FAMILY MEDICINE

## 2019-06-17 PROCEDURE — 1036F TOBACCO NON-USER: CPT | Performed by: FAMILY MEDICINE

## 2019-06-17 PROCEDURE — 1090F PRES/ABSN URINE INCON ASSESS: CPT | Performed by: FAMILY MEDICINE

## 2019-06-17 RX ORDER — HYDROCHLOROTHIAZIDE 12.5 MG/1
12.5 CAPSULE, GELATIN COATED ORAL DAILY
Qty: 90 CAPSULE | Refills: 1 | Status: SHIPPED | OUTPATIENT
Start: 2019-06-17 | End: 2019-12-13 | Stop reason: SDUPTHER

## 2019-06-17 RX ORDER — BENAZEPRIL HYDROCHLORIDE 20 MG/1
TABLET ORAL
Qty: 90 TABLET | Refills: 1 | Status: SHIPPED | OUTPATIENT
Start: 2019-06-17 | End: 2019-06-18

## 2019-06-17 RX ORDER — LEVOTHYROXINE SODIUM 0.07 MG/1
75 TABLET ORAL DAILY
Qty: 90 TABLET | Refills: 1 | Status: SHIPPED | OUTPATIENT
Start: 2019-06-17 | End: 2019-12-13 | Stop reason: SDUPTHER

## 2019-06-17 RX ORDER — CHLORAL HYDRATE 500 MG
3000 CAPSULE ORAL DAILY
Qty: 90 CAPSULE | Refills: 3 | COMMUNITY
Start: 2019-06-17

## 2019-06-17 RX ORDER — DONEPEZIL HYDROCHLORIDE 10 MG/1
TABLET, FILM COATED ORAL
Qty: 90 TABLET | Refills: 1 | Status: SHIPPED | OUTPATIENT
Start: 2019-06-17 | End: 2019-12-13 | Stop reason: SDUPTHER

## 2019-06-17 NOTE — PROGRESS NOTES
(ARICEPT) 10 MG tablet TAKE 1 TABLET NIGHTLY Yes Janina Reyes MD   benazepril (LOTENSIN) 20 MG tablet TAKE 1 TABLET BY MOUTH EVERY DAY Yes Janina Reyes MD   levothyroxine (SYNTHROID) 75 MCG tablet Take 1 tablet by mouth daily Yes Janina Reyes MD   hydrochlorothiazide (MICROZIDE) 12.5 MG capsule Take 1 capsule by mouth daily Yes Janina Reyes MD   Omega-3 Fatty Acids (FISH OIL) 1000 MG CAPS Take 3 capsules by mouth daily Yes Janina Reyes MD   Calcium Carb-Cholecalciferol (CALCIUM 500/D) 500-400 MG-UNIT CHEW Take 1 tablet by mouth daily. Yes Janina Reyes MD   Multiple Minerals-Vitamins (MIKE-MAG-ZINC-D) TABS Take 1 tablet by mouth. Yes Janina Reyes MD   cetirizine (ZYRTEC) 10 MG tablet Take 10 mg by mouth daily. Yes Historical Provider, MD   aspirin 325 MG tablet Take 325 mg by mouth daily. Yes Historical Provider, MD   dorzolamide-timolol (COSOPT) 22.3-6.8 MG/ML ophthalmic solution 1 drop 2 times daily.  Dr. Keith Isidro - [facility] Yes Historical Provider, MD      Family History   Problem Relation Age of Onset    Breast Cancer Daughter      Social History     Tobacco Use    Smoking status: Never Smoker    Smokeless tobacco: Never Used    Tobacco comment: congrats   Substance Use Topics    Alcohol use: No    Drug use: No      LAST LABS  Cholesterol, Total   Date Value Ref Range Status   02/26/2019 211 (H) 0 - 199 mg/dL Final     LDL Calculated   Date Value Ref Range Status   02/26/2019 123 (H) <100 mg/dL Final     HDL   Date Value Ref Range Status   02/26/2019 63 (H) 40 - 60 mg/dL Final   05/03/2012 49 40 - 60 mg/dl Final     Triglycerides   Date Value Ref Range Status   02/26/2019 123 0 - 150 mg/dL Final     Lab Results   Component Value Date    GLUCOSE 101 (H) 02/26/2019     Lab Results   Component Value Date     02/26/2019    K 4.2 02/26/2019    CREATININE 0.9 02/26/2019     Lab Results   Component Value Date    WBC 5.4 02/15/2018    HGB 12.3 02/15/2018    HCT 36.6 02/15/2018    MCV 93.7 02/15/2018    PLT 278 02/15/2018     Lab Results   Component Value Date    ALT 16 02/26/2019    AST 20 02/26/2019    ALKPHOS 53 02/26/2019    BILITOT 0.4 02/26/2019     TSH (uIU/mL)   Date Value   02/26/2019 1.30     Lab Results   Component Value Date    LABA1C 5.6 02/26/2019        Objective:   PHYSICAL EXAM   /60 (Site: Left Upper Arm, Position: Sitting, Cuff Size: Medium Adult)   Pulse 80   Temp 97.7 °F (36.5 °C) (Oral)   Resp 18   Ht 5' 1\" (1.549 m)   Wt 126 lb (57.2 kg)   BMI 23.81 kg/m²   BP Readings from Last 5 Encounters:   06/17/19 128/60   02/26/19 118/78   11/20/18 128/60   08/21/18 130/70   07/09/18 138/68     Wt Readings from Last 5 Encounters:   06/17/19 126 lb (57.2 kg)   02/26/19 129 lb (58.5 kg)   11/20/18 133 lb (60.3 kg)   08/21/18 131 lb (59.4 kg)   07/09/18 130 lb (59 kg)      GENERAL:   · well-developed, well-nourished, alert, no distress. EYES:   · External findings: lids and lashes normal and conjunctivae and sclerae normal  LUNGS:    · Breathing unlabored  · clear to auscultation bilaterally and good air movement  CARDIOVASC:   · regular rate and rhythm, 3/6 holosys murmur left sternal border  · LEGS:  Lower extremity edema: none    SKIN: warm and dry  PSYCH:    · Alert and oriented  · Normal reasoning, insight good  · Facial expressions full, mood appropriate  · No memory disturbance noted     Assessment and Plan:      Diagnosis Orders   1. 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 Dementia without behavioral disturbance, unspecified dementia type  donepezil (ARICEPT) 10 MG tablet   3. At high risk for falls     4. Essential hypertension  benazepril (LOTENSIN) 20 MG tablet    hydrochlorothiazide (MICROZIDE) 12.5 MG capsule   5. Right foot drop     6. Acquired hypothyroidism  levothyroxine (SYNTHROID) 75 MCG tablet   Stable. Continue current Tx plan except for changes marked below.     INSTRUCTIONS  NEXT APPOINTMENT: Please schedule annual complete physical (30 minutes) in 5-6 months. · PLEASE TAKE THIS FORM TO CHECK-OUT WINDOW TO SCHEDULE NEXT VISIT.

## 2019-06-17 NOTE — PATIENT INSTRUCTIONS
INSTRUCTIONS  NEXT APPOINTMENT: Please schedule annual complete physical (30 minutes) in 5-6 months. · PLEASE TAKE THIS FORM TO CHECK-OUT WINDOW TO SCHEDULE NEXT VISIT.

## 2019-06-18 RX ORDER — BENAZEPRIL HYDROCHLORIDE 20 MG/1
TABLET ORAL
Qty: 90 TABLET | Refills: 1 | Status: SHIPPED | OUTPATIENT
Start: 2019-06-18 | End: 2020-03-19 | Stop reason: SDUPTHER

## 2020-02-03 ENCOUNTER — TELEPHONE (OUTPATIENT)
Dept: FAMILY MEDICINE CLINIC | Age: 85
End: 2020-02-03

## 2020-03-19 RX ORDER — HYDROCHLOROTHIAZIDE 12.5 MG/1
CAPSULE, GELATIN COATED ORAL
Qty: 90 CAPSULE | Refills: 0 | Status: SHIPPED | OUTPATIENT
Start: 2020-03-19 | End: 2020-07-10 | Stop reason: SDUPTHER

## 2020-03-19 RX ORDER — DONEPEZIL HYDROCHLORIDE 10 MG/1
TABLET, FILM COATED ORAL
Qty: 90 TABLET | Refills: 0 | Status: SHIPPED | OUTPATIENT
Start: 2020-03-19 | End: 2020-07-10 | Stop reason: SDUPTHER

## 2020-03-19 RX ORDER — LEVOTHYROXINE SODIUM 0.07 MG/1
TABLET ORAL
Qty: 90 TABLET | Refills: 0 | Status: SHIPPED | OUTPATIENT
Start: 2020-03-19 | End: 2020-07-10 | Stop reason: SDUPTHER

## 2020-03-19 RX ORDER — BENAZEPRIL HYDROCHLORIDE 20 MG/1
TABLET ORAL
Qty: 90 TABLET | Refills: 0 | Status: SHIPPED | OUTPATIENT
Start: 2020-03-19 | End: 2020-07-10 | Stop reason: SDUPTHER

## 2020-04-13 RX ORDER — HYDROCHLOROTHIAZIDE 12.5 MG/1
CAPSULE, GELATIN COATED ORAL
Qty: 90 CAPSULE | Refills: 1 | OUTPATIENT
Start: 2020-04-13

## 2020-04-13 RX ORDER — DONEPEZIL HYDROCHLORIDE 10 MG/1
TABLET, FILM COATED ORAL
Qty: 90 TABLET | Refills: 1 | OUTPATIENT
Start: 2020-04-13

## 2020-04-17 ENCOUNTER — TELEPHONE (OUTPATIENT)
Dept: FAMILY MEDICINE CLINIC | Age: 85
End: 2020-04-17

## 2020-04-17 NOTE — TELEPHONE ENCOUNTER
DOP called in needs a placard that expires in July    The pt needs a new mattress they have turned it and the center of it sinks     DOP stated has a lot of pflem when she wakes this up this has been going on for a long time been using mucinex not helping       Please advise

## 2020-04-24 ENCOUNTER — TELEMEDICINE (OUTPATIENT)
Dept: FAMILY MEDICINE CLINIC | Age: 85
End: 2020-04-24
Payer: COMMERCIAL

## 2020-04-24 PROCEDURE — 4040F PNEUMOC VAC/ADMIN/RCVD: CPT | Performed by: FAMILY MEDICINE

## 2020-04-24 PROCEDURE — 1090F PRES/ABSN URINE INCON ASSESS: CPT | Performed by: FAMILY MEDICINE

## 2020-04-24 PROCEDURE — 1123F ACP DISCUSS/DSCN MKR DOCD: CPT | Performed by: FAMILY MEDICINE

## 2020-04-24 PROCEDURE — G8427 DOCREV CUR MEDS BY ELIG CLIN: HCPCS | Performed by: FAMILY MEDICINE

## 2020-04-24 PROCEDURE — 99213 OFFICE O/P EST LOW 20 MIN: CPT | Performed by: FAMILY MEDICINE

## 2020-05-17 ENCOUNTER — TELEPHONE (OUTPATIENT)
Dept: FAMILY MEDICINE CLINIC | Age: 85
End: 2020-05-17

## 2020-05-17 RX ORDER — ALBUTEROL SULFATE 90 UG/1
2 AEROSOL, METERED RESPIRATORY (INHALATION) 4 TIMES DAILY PRN
Qty: 1 INHALER | Refills: 0 | Status: SHIPPED | OUTPATIENT
Start: 2020-05-17 | End: 2020-06-08

## 2020-05-18 ENCOUNTER — VIRTUAL VISIT (OUTPATIENT)
Dept: FAMILY MEDICINE CLINIC | Age: 85
End: 2020-05-18
Payer: COMMERCIAL

## 2020-05-18 PROCEDURE — 1123F ACP DISCUSS/DSCN MKR DOCD: CPT | Performed by: FAMILY MEDICINE

## 2020-05-18 PROCEDURE — 1090F PRES/ABSN URINE INCON ASSESS: CPT | Performed by: FAMILY MEDICINE

## 2020-05-18 PROCEDURE — 4040F PNEUMOC VAC/ADMIN/RCVD: CPT | Performed by: FAMILY MEDICINE

## 2020-05-18 PROCEDURE — 99214 OFFICE O/P EST MOD 30 MIN: CPT | Performed by: FAMILY MEDICINE

## 2020-05-18 PROCEDURE — G8427 DOCREV CUR MEDS BY ELIG CLIN: HCPCS | Performed by: FAMILY MEDICINE

## 2020-05-18 NOTE — PROGRESS NOTES
Jarad Ramos MD   donepezil (ARICEPT) 10 MG tablet TAKE 1 TABLET BY MOUTH EVERY DAY AT NIGHT  Keyona Pozo MD   hydrochlorothiazide (MICROZIDE) 12.5 MG capsule TAKE 1 CAPSULE BY MOUTH EVERY DAY  Keyona Pozo MD   benazepril (LOTENSIN) 20 MG tablet TAKE 1 TABLET BY MOUTH EVERY DAY  Keyona Pozo MD   levothyroxine (SYNTHROID) 75 MCG tablet TAKE 1 TABLET BY MOUTH EVERY DAY  Keyona Pozo MD   Misc. Devices (COMMODE BEDSIDE) MISC With openning to fit over toilet  MD Shanta Perry. Devices (TRANSPORT CHAIR) MISC Lightweight, collapsible  Keyona Pozo MD   Omega-3 Fatty Acids (FISH OIL) 1000 MG CAPS Take 3 capsules by mouth daily  Keyona Pozo MD   Calcium Carb-Cholecalciferol (CALCIUM 500/D) 500-400 MG-UNIT CHEW Take 1 tablet by mouth daily. Keyona Pozo MD   Multiple Minerals-Vitamins (MIKE-MAG-ZINC-D) TABS Take 1 tablet by mouth. Keyona Pozo MD   cetirizine (ZYRTEC) 10 MG tablet Take 10 mg by mouth daily. Historical Provider, MD   aspirin 325 MG tablet Take 325 mg by mouth daily. Historical Provider, MD   dorzolamide-timolol (COSOPT) 22.3-6.8 MG/ML ophthalmic solution 1 drop 2 times daily.  Dr. Ayla Sosa - I  Historical Provider, MD      Family History   Problem Relation Age of Onset    Breast Cancer Daughter      Social History     Tobacco Use    Smoking status: Never Smoker    Smokeless tobacco: Never Used    Tobacco comment: congrats   Substance Use Topics    Alcohol use: No    Drug use: No      LAST LABS  Cholesterol, Total   Date Value Ref Range Status   02/26/2019 211 (H) 0 - 199 mg/dL Final     LDL Calculated   Date Value Ref Range Status   02/26/2019 123 (H) <100 mg/dL Final     HDL   Date Value Ref Range Status   02/26/2019 63 (H) 40 - 60 mg/dL Final   05/03/2012 49 40 - 60 mg/dl Final     Triglycerides   Date Value Ref Range Status   02/26/2019 123 0 - 150 mg/dL Final     Lab Results   Component Value Date    GLUCOSE 101 (H) 02/26/2019     Lab Results   Component Value Date  02/26/2019    K 4.2 02/26/2019    CREATININE 0.9 02/26/2019     Lab Results   Component Value Date    WBC 5.4 02/15/2018    HGB 12.3 02/15/2018    HCT 36.6 02/15/2018    MCV 93.7 02/15/2018     02/15/2018     Lab Results   Component Value Date    ALT 16 02/26/2019    AST 20 02/26/2019    ALKPHOS 53 02/26/2019    BILITOT 0.4 02/26/2019     TSH (uIU/mL)   Date Value   02/26/2019 1.30     Lab Results   Component Value Date    LABA1C 5.6 02/26/2019      Objective:   PHYSICAL EXAM:  Vitals (if available)      GENERAL:   · well-developed, well-nourished, alert, no distress but breathing a little fast.. EYES:   · External findings: lids and lashes normal and conjunctivae and sclerae normal  · Eyes: no periorbital cellulitis. HENT:   · Normocephalic, atraumatic  · External nose and ears appear normal  · Mucous membranes are moist  · Hearing grossly normal.     NECK: No visible masses  LUNGS:    · Respiratory effort normal.  · No visualized signs of difficulty breathing or respiratory distress  SKIN: warm and dry  · No significant exanthematous lesions or discoloration noted on facial skin  PSYCH:    · Alert and oriented, able to follow commands  · Normal reasoning, insight good  · Normal affect  · No memory disturbance noted  NEURO:   No Facial Asymmetry (Cranial nerve 7 motor function) (limited exam to video visit)      No gaze palsy      Assessment and Plan:      Diagnosis Orders   1. Cough     2. Wheezes     3. Low grade fever     DDx: pneumonia, COVID, URI with COPD, UTI, etc  Seems comfortable, no physical activity limitation, improved with albuterol  Plan below. INSTRUCTIONS  · May need blood work  And CXR if not better. In couple days. Virtual Visit (video visit) encounter employed to address concerns as mentioned above. A caregiver was present when appropriate.  Due to this being a TeleHealth encounter (During [facility]- public health emergency), evaluation of the following organ systems

## 2020-05-22 ENCOUNTER — TELEPHONE (OUTPATIENT)
Dept: FAMILY MEDICINE CLINIC | Age: 85
End: 2020-05-22

## 2020-05-22 RX ORDER — PEAK FLOW METER
EACH MISCELLANEOUS
Qty: 1 DEVICE | Refills: 0 | Status: SHIPPED | OUTPATIENT
Start: 2020-05-22

## 2020-05-22 NOTE — TELEPHONE ENCOUNTER
DOP calling with a update   Pt is doing much better today pt was given a vaporizer she got a massage with some tyrone's they used peppermint oil hot water   Pt has been given tea with jigna honey tea       FYI

## 2020-05-22 NOTE — TELEPHONE ENCOUNTER
Pt is needing a rx for an spirometer   meter. Wants to know if we can send over an rx   To [facility]. Fax is   [phone]      Please advise.

## 2020-05-27 ENCOUNTER — TELEPHONE (OUTPATIENT)
Dept: FAMILY MEDICINE CLINIC | Age: 85
End: 2020-05-27

## 2020-05-27 NOTE — TELEPHONE ENCOUNTER
DOP called in wants to know if we can call something in for the pflem need something to break it up to make it easier when she coughs up the pflem   Tried vicks rub and mucinex  Pt is doing better just needs something stronger than mucinex         Please advise

## 2020-05-29 NOTE — TELEPHONE ENCOUNTER
Would she tolerate compression stockings. Could send in order to go have her fitted. However, if foot not hurting and skin not stretched  Tight there is no need to do anything.

## 2020-06-08 RX ORDER — ALBUTEROL SULFATE 90 UG/1
2 AEROSOL, METERED RESPIRATORY (INHALATION) 4 TIMES DAILY PRN
Qty: 8.5 INHALER | Refills: 0 | Status: SHIPPED | OUTPATIENT
Start: 2020-06-08 | End: 2020-07-07 | Stop reason: SDUPTHER

## 2020-06-09 ENCOUNTER — TELEPHONE (OUTPATIENT)
Dept: FAMILY MEDICINE CLINIC | Age: 85
End: 2020-06-09

## 2020-06-09 NOTE — TELEPHONE ENCOUNTER
DOP called in thinks the pt has a UTI  DOP stated she is seeing things that is not there  Pt is going to bathroom a lot   DOP is wondering if something be called in       Please advise

## 2020-06-10 ENCOUNTER — VIRTUAL VISIT (OUTPATIENT)
Dept: FAMILY MEDICINE CLINIC | Age: 85
End: 2020-06-10
Payer: COMMERCIAL

## 2020-06-10 DIAGNOSIS — R35.0 URINARY FREQUENCY: ICD-10-CM

## 2020-06-10 DIAGNOSIS — R41.0 TRANSIENT CONFUSION: ICD-10-CM

## 2020-06-10 PROCEDURE — G8427 DOCREV CUR MEDS BY ELIG CLIN: HCPCS | Performed by: FAMILY MEDICINE

## 2020-06-10 PROCEDURE — 99214 OFFICE O/P EST MOD 30 MIN: CPT | Performed by: FAMILY MEDICINE

## 2020-06-10 PROCEDURE — 1090F PRES/ABSN URINE INCON ASSESS: CPT | Performed by: FAMILY MEDICINE

## 2020-06-10 PROCEDURE — 4040F PNEUMOC VAC/ADMIN/RCVD: CPT | Performed by: FAMILY MEDICINE

## 2020-06-10 PROCEDURE — 1123F ACP DISCUSS/DSCN MKR DOCD: CPT | Performed by: FAMILY MEDICINE

## 2020-06-11 LAB — URINE CULTURE, ROUTINE: NORMAL

## 2020-06-12 ENCOUNTER — TELEPHONE (OUTPATIENT)
Dept: FAMILY MEDICINE CLINIC | Age: 85
End: 2020-06-12

## 2020-06-12 NOTE — TELEPHONE ENCOUNTER
Please advise DOP that Nicolasa's urine culture is negative, no sign of infection. I do not see any blood work drawn that was ordered? Please check in to make sure Kamryn Talamantes is not having any more concerning confusion or altered mental status.  Thank you

## 2020-06-29 RX ORDER — ALBUTEROL SULFATE 90 UG/1
2 AEROSOL, METERED RESPIRATORY (INHALATION) 4 TIMES DAILY PRN
Qty: 8.5 INHALER | Refills: 0 | OUTPATIENT
Start: 2020-06-29

## 2020-07-07 RX ORDER — ALBUTEROL SULFATE 90 UG/1
2 AEROSOL, METERED RESPIRATORY (INHALATION) 4 TIMES DAILY PRN
Qty: 8.5 INHALER | Refills: 0 | Status: SHIPPED | OUTPATIENT
Start: 2020-07-07 | End: 2021-07-28

## 2020-07-10 RX ORDER — DONEPEZIL HYDROCHLORIDE 10 MG/1
TABLET, FILM COATED ORAL
Qty: 90 TABLET | Refills: 0 | Status: SHIPPED | OUTPATIENT
Start: 2020-07-10 | End: 2020-10-08 | Stop reason: SDUPTHER

## 2020-07-10 RX ORDER — BENAZEPRIL HYDROCHLORIDE 20 MG/1
TABLET ORAL
Qty: 90 TABLET | Refills: 0 | Status: SHIPPED | OUTPATIENT
Start: 2020-07-10 | End: 2020-10-07

## 2020-07-10 RX ORDER — LEVOTHYROXINE SODIUM 0.07 MG/1
TABLET ORAL
Qty: 90 TABLET | Refills: 0 | Status: SHIPPED | OUTPATIENT
Start: 2020-07-10 | End: 2020-10-08 | Stop reason: SDUPTHER

## 2020-07-10 RX ORDER — HYDROCHLOROTHIAZIDE 12.5 MG/1
CAPSULE, GELATIN COATED ORAL
Qty: 90 CAPSULE | Refills: 0 | Status: SHIPPED | OUTPATIENT
Start: 2020-07-10 | End: 2020-10-08 | Stop reason: SDUPTHER

## 2020-07-28 ENCOUNTER — TELEPHONE (OUTPATIENT)
Dept: FAMILY MEDICINE CLINIC | Age: 85
End: 2020-07-28

## 2020-07-28 ENCOUNTER — OFFICE VISIT (OUTPATIENT)
Dept: FAMILY MEDICINE CLINIC | Age: 85
End: 2020-07-28
Payer: COMMERCIAL

## 2020-07-28 ENCOUNTER — HOSPITAL ENCOUNTER (OUTPATIENT)
Dept: GENERAL RADIOLOGY | Age: 85
Discharge: HOME | End: 2020-07-28
Payer: COMMERCIAL

## 2020-07-28 ENCOUNTER — HOSPITAL ENCOUNTER (OUTPATIENT)
Age: 85
Discharge: HOME | End: 2020-07-28
Payer: COMMERCIAL

## 2020-07-28 VITALS
HEART RATE: 87 BPM | WEIGHT: 136.2 LBS | DIASTOLIC BLOOD PRESSURE: 70 MMHG | RESPIRATION RATE: 20 BRPM | TEMPERATURE: 97.7 F | BODY MASS INDEX: 25.71 KG/M2 | SYSTOLIC BLOOD PRESSURE: 126 MMHG | HEIGHT: 61 IN | OXYGEN SATURATION: 95 %

## 2020-07-28 DIAGNOSIS — R05.9 COUGH: ICD-10-CM

## 2020-07-28 DIAGNOSIS — R60.0 BILATERAL LEG EDEMA: ICD-10-CM

## 2020-07-28 LAB
A/G RATIO: 1.2 (ref 1.1–2.2)
ALBUMIN SERPL-MCNC: 4.1 G/DL (ref 3.4–5)
ALP BLD-CCNC: 58 U/L (ref 40–129)
ALT SERPL-CCNC: 14 U/L (ref 10–40)
ANION GAP SERPL CALCULATED.3IONS-SCNC: 10 MMOL/L (ref 3–16)
AST SERPL-CCNC: 20 U/L (ref 15–37)
BILIRUB SERPL-MCNC: 0.3 MG/DL (ref 0–1)
BUN BLDV-MCNC: 19 MG/DL (ref 7–20)
CALCIUM SERPL-MCNC: 9.8 MG/DL (ref 8.3–10.6)
CHLORIDE BLD-SCNC: 101 MMOL/L (ref 99–110)
CO2: 27 MMOL/L (ref 21–32)
CREAT SERPL-MCNC: 0.9 MG/DL (ref 0.6–1.2)
GFR AFRICAN AMERICAN: >60
GFR NON-AFRICAN AMERICAN: 59
GLOBULIN: 3.5 G/DL
GLUCOSE BLD-MCNC: 88 MG/DL (ref 70–99)
HCT VFR BLD CALC: 36.2 % (ref 36–48)
HEMOGLOBIN: 12.2 G/DL (ref 12–16)
MCH RBC QN AUTO: 32.2 PG (ref 26–34)
MCHC RBC AUTO-ENTMCNC: 33.8 G/DL (ref 31–36)
MCV RBC AUTO: 95.5 FL (ref 80–100)
PDW BLD-RTO: 14.3 % (ref 12.4–15.4)
PLATELET # BLD: 271 K/UL (ref 135–450)
PMV BLD AUTO: 9.3 FL (ref 5–10.5)
POTASSIUM SERPL-SCNC: 4.1 MMOL/L (ref 3.5–5.1)
RBC # BLD: 3.79 M/UL (ref 4–5.2)
SODIUM BLD-SCNC: 138 MMOL/L (ref 136–145)
TOTAL PROTEIN: 7.6 G/DL (ref 6.4–8.2)
WBC # BLD: 5.5 K/UL (ref 4–11)

## 2020-07-28 PROCEDURE — 1090F PRES/ABSN URINE INCON ASSESS: CPT | Performed by: NURSE PRACTITIONER

## 2020-07-28 PROCEDURE — 1036F TOBACCO NON-USER: CPT | Performed by: NURSE PRACTITIONER

## 2020-07-28 PROCEDURE — G8427 DOCREV CUR MEDS BY ELIG CLIN: HCPCS | Performed by: NURSE PRACTITIONER

## 2020-07-28 PROCEDURE — 4040F PNEUMOC VAC/ADMIN/RCVD: CPT | Performed by: NURSE PRACTITIONER

## 2020-07-28 PROCEDURE — 99214 OFFICE O/P EST MOD 30 MIN: CPT | Performed by: NURSE PRACTITIONER

## 2020-07-28 PROCEDURE — 93000 ELECTROCARDIOGRAM COMPLETE: CPT | Performed by: NURSE PRACTITIONER

## 2020-07-28 PROCEDURE — 71046 X-RAY EXAM CHEST 2 VIEWS: CPT

## 2020-07-28 PROCEDURE — 1123F ACP DISCUSS/DSCN MKR DOCD: CPT | Performed by: NURSE PRACTITIONER

## 2020-07-28 PROCEDURE — G8417 CALC BMI ABV UP PARAM F/U: HCPCS | Performed by: NURSE PRACTITIONER

## 2020-07-28 ASSESSMENT — PATIENT HEALTH QUESTIONNAIRE - PHQ9
1. LITTLE INTEREST OR PLEASURE IN DOING THINGS: 0
SUM OF ALL RESPONSES TO PHQ QUESTIONS 1-9: 0
SUM OF ALL RESPONSES TO PHQ QUESTIONS 1-9: 0
2. FEELING DOWN, DEPRESSED OR HOPELESS: 0
SUM OF ALL RESPONSES TO PHQ9 QUESTIONS 1 & 2: 0

## 2020-07-28 ASSESSMENT — ENCOUNTER SYMPTOMS
SHORTNESS OF BREATH: 1
NAUSEA: 0
VOMITING: 0
COUGH: 1
DIARRHEA: 0
COLOR CHANGE: 0

## 2020-07-28 NOTE — PROGRESS NOTES
7/28/2020    This is a 80 y.o. female   Chief Complaint   Patient presents with    Leg Swelling     x2 days. shortness of breathe. wheezing. affected her walking. vitals have been good   . HPI  Patient is here with her daughter who is providing the history. Patient has history of dementia. Has noticed right foot swelling for the past 2 days. Right is worse than left. Denies pain in the area. Denies redness. Breathing seems to be a little more labored. 145/60 was BP yesterday. Sometimes she will get a irregular heart beat. Oxygen has been in the upper 90s. Denies fever. Slight cough. Will have more cough after drinking. Daughter has not noticed coughing after eating solids. Denies recent weight gain.       Patient Active Problem List   Diagnosis    HTN (hypertension)    Hypothyroidism    Hemiplegia, late effect of cerebrovascular disease ([facility] [address]- R leg foot drop    Osteopenia- DXA -2.2 (7/14), -1.5 (4/11), -1.8 (8/08)    GERD (gastroesophageal reflux disease)    Chronic low back pain    Spondylosis, cervical    Aortic sclerosis- 2/6 KANCHAN, NL ECHO    Glaucoma    Retinal detachment    Constipation    Hyperlipidemia with target LDL less than 100    Allergic Conjunctivitis    History of CVA (cerebrovascular accident)    Urge incontinence    Osteoarthritis of lumbar spine    History of compression fracture of spine    Osteoarthritis of spine    Lumbar disc herniation    Dementia without behavioral disturbance (HCC)    Urinary frequency    Needs flu shot- declines    Need for pneumococcal vaccination    Prediabetes    At high risk for falls    Right foot drop          Current Outpatient Medications   Medication Sig Dispense Refill    benazepril (LOTENSIN) 20 MG tablet TAKE 1 TABLET BY MOUTH EVERY DAY 90 tablet 0    hydroCHLOROthiazide (MICROZIDE) 12.5 MG capsule TAKE 1 CAPSULE BY MOUTH EVERY DAY 90 capsule 0    donepezil (ARICEPT) 10 MG tablet TAKE 1 TABLET BY MOUTH EVERY DAY AT NIGHT 90 tablet 0    levothyroxine (SYNTHROID) 75 MCG tablet TAKE 1 TABLET BY MOUTH EVERY DAY 90 tablet 0    albuterol sulfate  (90 Base) MCG/ACT inhaler Inhale 2 puffs into the lungs 4 times daily as needed for Wheezing 8.5 Inhaler 0    Elastic Bandages & Supports (TRUFORM SUPPORT SOCK 20-30MMHG) MISC Bilateral knee high. With/without zipper. Take Rx to medical supply such as [facility] [address], phone [phone] (has zipper) [facility] [phone] (no zipper). Product/Procedure Code:   I83.10 (chronic venous stasis), R60.0 (edema) 2 each 0    UNABLE TO FIND Incentive spirometer 1 each 0    Respiratory Therapy Supplies (ONE FLOW SPIROMETER) EDNA Use as needed 1 Device 0    Spacer/Aero-Holding Chambers EDNA 1 Device by Does not apply route daily 1 Device 0    Misc. Devices (MATTRESS PAD) MISC [employer]; Egg Crate mattress 1 each 0    Misc. Devices ([address] [place] [address]) MISC With openning to fit over toilet 1 each 0    Misc. Devices (TRANSPORT CHAIR) MISC Lightweight, collapsible 1 each 0    Omega-3 Fatty Acids (FISH OIL) 1000 MG CAPS Take 3 capsules by mouth daily 90 capsule 3    Calcium Carb-Cholecalciferol (CALCIUM 500/D) 500-400 MG-UNIT CHEW Take 1 tablet by mouth daily.  Multiple Minerals-Vitamins (MIKE-MAG-ZINC-D) TABS Take 1 tablet by mouth.  cetirizine (ZYRTEC) 10 MG tablet Take 10 mg by mouth daily.  aspirin 325 MG tablet Take 325 mg by mouth daily.  dorzolamide-timolol (COSOPT) 22.3-6.8 MG/ML ophthalmic solution 1 drop 2 times daily. Dr. Armando Leiva - [facility]       No current facility-administered medications for this visit. Allergies   Allergen Reactions    Bactrim [Sulfamethoxazole-Trimethoprim]     Reglan [Metoclopramide Hcl]        Review of Systems   Constitutional: Negative for activity change and fever. Respiratory: Positive for cough and shortness of breath. Cardiovascular: Positive for leg swelling. Negative for chest pain and palpitations. Gastrointestinal: Negative for diarrhea, nausea and vomiting. Skin: Negative for color change. Neurological: Negative for dizziness and headaches. Vitals:    07/28/20 1335   BP: 126/70   Site: Left Upper Arm   Position: Sitting   Cuff Size: Medium Adult   Pulse: 87   Resp: 20   Temp: 97.7 °F (36.5 °C)   TempSrc: Temporal   SpO2: 95%   Weight: 136 lb 3.2 oz (61.8 kg)   Height: 5' 1\" (1.549 m)       Body mass index is 25.73 kg/m². Wt Readings from Last 3 Encounters:   07/28/20 136 lb 3.2 oz (61.8 kg)   06/17/19 126 lb (57.2 kg)   02/26/19 129 lb (58.5 kg)       BP Readings from Last 3 Encounters:   07/28/20 126/70   06/17/19 128/60   02/26/19 118/78       Physical Exam  Vitals signs and nursing note reviewed. Constitutional:       General: She is not in acute distress. Appearance: She is well-developed. Comments: Sitting in wheelchair    HENT:      Head: Normocephalic and atraumatic. Neck:      Musculoskeletal: Neck supple. Cardiovascular:      Rate and Rhythm: Normal rate and regular rhythm. Heart sounds: Murmur present. Systolic murmur present with a grade of 3/6. No friction rub. No gallop. Pulmonary:      Effort: Pulmonary effort is normal. No respiratory distress. Breath sounds: Normal breath sounds. No wheezing. Musculoskeletal:      Right lower leg: Edema present. Left lower leg: Edema present. Comments: +1 pitting jan ankle edema    Skin:     General: Skin is warm and dry. Neurological:      Mental Status: She is alert. Psychiatric:         Behavior: Behavior normal.         Cosmeand Radha  Bisi Rao was seen today for leg swelling. Diagnoses and all orders for this visit:    Irregular heart rate: noted on exam. Patient denies symptoms.   -     EKG 12 Lead- sinus rhythm with frequent PACs    Cough  -     XR CHEST (2 VW); Future  -     Comprehensive Metabolic Panel; Future  -     CBC;  Future    Dysphagia, unspecified type  -     SLP video swallow  Will have her complete swallowing evaluation due to coughing with liquids. Bilateral leg edema  -     Comprehensive Metabolic Panel; Future  -     CBC; Future  Advised to wear compression stockings daily. Elevate legs at rest.   Daughter is to call with worsening symptoms or with increase respiratory symptoms. Return if symptoms worsen or fail to improve. F/u after swallowing evaluation.

## 2020-07-30 ENCOUNTER — TELEPHONE (OUTPATIENT)
Dept: FAMILY MEDICINE CLINIC | Age: 85
End: 2020-07-30

## 2020-08-24 ENCOUNTER — TELEPHONE (OUTPATIENT)
Dept: FAMILY MEDICINE CLINIC | Age: 85
End: 2020-08-24

## 2020-08-24 NOTE — TELEPHONE ENCOUNTER
Puja with [employer] is calling in regard to form that was faxed over and faxed back but some information was missing  She will fax form over again and form needs to have ICD code, date of evaluation (last time seen), and Dr. Vasiliy Hoyos to date the signature   Please advise

## 2020-08-25 RX ORDER — HYDROCHLOROTHIAZIDE 12.5 MG/1
CAPSULE, GELATIN COATED ORAL
Qty: 90 CAPSULE | Refills: 0 | OUTPATIENT
Start: 2020-08-25

## 2020-08-25 NOTE — TELEPHONE ENCOUNTER
----- Message from [address] sent at 8/25/2020  2:16 PM EDT -----  Subject: Message to Provider    QUESTIONS  Information for Provider? Patient need water pill prescription   she is gaining and retaining weight   ---------------------------------------------------------------------------  --------------  CALL BACK INFO  What is the best way for the office to contact you? OK to leave message on   voicemail  Preferred Call Back Phone Number? [phone]  ---------------------------------------------------------------------------  --------------  SCRIPT ANSWERS  Relationship to Patient? Other  Representative Name? [place]  Additional information verified (besides Name and Date of Birth)? Address  Appointment reason? Well Care/Follow Ups  Select a Well Care/Follow Ups appointment reason? Adult Existing Condition   Follow Up [Diabetes   CHF   COPD   Hypertension/Blood Pressure Check]  (Is the patient requesting to be seen urgently for their symptoms?)? No  Is this follow up request related to routine Diabetes Management? No  Are you having any new concerns about your existing condition? Yes  Have you been diagnosed with   tested for   or told that you are suspected of having COVID-19 (Coronavirus)? No  Have you had a fever or taken medication to treat a fever within the past   3 days? No  Have you had a cough   shortness of breath or flu-like symptoms within the past 3 days? No  Do you currently have flu-like symptoms including fever or chills   cough   shortness of breath   or difficulty breathing   or new loss of taste or smell? No  (Service Expert  click yes below to proceed with [facility] As Usual   Scheduling)?  Yes

## 2020-09-01 ENCOUNTER — TELEPHONE (OUTPATIENT)
Dept: FAMILY MEDICINE CLINIC | Age: 85
End: 2020-09-01

## 2020-09-01 DIAGNOSIS — R13.10 DYSPHAGIA, UNSPECIFIED TYPE: Primary | ICD-10-CM

## 2020-09-04 ENCOUNTER — HOSPITAL ENCOUNTER (OUTPATIENT)
Dept: SPEECH THERAPY | Age: 85
Setting detail: THERAPIES SERIES
Discharge: HOME | End: 2020-09-04
Payer: COMMERCIAL

## 2020-09-04 ENCOUNTER — HOSPITAL ENCOUNTER (OUTPATIENT)
Dept: GENERAL RADIOLOGY | Age: 85
Discharge: HOME | End: 2020-09-04
Payer: COMMERCIAL

## 2020-09-04 ENCOUNTER — TELEPHONE (OUTPATIENT)
Dept: FAMILY MEDICINE CLINIC | Age: 85
End: 2020-09-04

## 2020-09-04 PROCEDURE — 92611 MOTION FLUOROSCOPY/SWALLOW: CPT

## 2020-09-04 PROCEDURE — 74230 X-RAY XM SWLNG FUNCJ C+: CPT

## 2020-09-04 NOTE — TELEPHONE ENCOUNTER
Pharmacy called in the pt need stockings the Dr is signing it needs to be dated and please make sure to  check the boxes on the form

## 2020-09-04 NOTE — PROCEDURES
[facility]   Speech Therapy   MODIFIED BARIUM SWALLOW      Michael Gilliam  AGE: 80 y.o. GENDER: female  : 1931  [id number]  EPISODE DATE:  2020  Ordering MD:  Ordering Physician: Dr.K. Mayela Denney  Radiologist:  Radiologist: Dr. Bhavna Caraballo  Date of Eval:  2020     Type of Study: Modified Barium Swallow    ONSET DATE: 2020       MEDICAL DIAGNOSIS: Dysphagia  TREATMENT DIAGNOSIS: Oropharyngeal Dysphagia    PAST MEDICAL HISTORY   has a past medical history of Aortic sclerosis- 2/6 KANCHAN, NL ECHO, Chronic low back pain, Cystocele without mention of uterine prolapse, Gastritis, GERD (gastroesophageal reflux disease), Glaucoma, HTN (hypertension), Hyperlipidemia LDL goal < 100, Hypothyroidism, Spondylosis, cervical, Unspecified cataract, and Unspecified retinal detachment. PAST SURGICAL HISTORY  Past Surgical History:   Procedure Laterality Date    TRABECULECTOMY  2009    Right with vision correction    TRABECULECTOMY      Right    UPPER GASTROINTESTINAL ENDOSCOPY           ALLERGIES  Allergies   Allergen Reactions    Bactrim [Sulfamethoxazole-Trimethoprim]     Reglan [Metoclopramide Hcl]            Subjective:     Reason for referral: Michael Gilliam was referred for a Modified Barium Swallow study to assess  the efficiency of swallow function, rule out aspiration and make recommendations regarding safe dietary consistencies, effective compensatory strategies, and safe eating environment. PATIENT AND FAMILY / STAFF COMPLAINTS:   · Pt denies problems chewing or swallow  · Intake form completed by MD and/or family indicating multiple swallows; SOB; Breathing Difficulty; Belching alot     Diet prior to study:   Current Diet Solid Consistency: Regular  Current Diet Liquid Consistency:  Thin    Objective: Impressions and Recommendations     IMPRESSIONS:    1. Behavior/Cognition  Behavior/Cognition: Alert, Cooperative, Pleasant mood(Poor Historian;[place]; followed 75% of 1 step commands; needed variable mild tactile cues. 2.Dysphagia Diagnosis:   Oral >Pharyngeal Dysphagia. Pt tolerated all presentations without penetration or aspiration and without pharyngeal residue. · Dysphagia characterized by anterior munching/mashing mastication pattern; lingual mashing A-P oral transit; piecemeal swallows; delayed initiation of swallow. Pt had good sensation of oral residue and spontaneously clears. Recommend continue diet as desired. No belching noted during this procedure. Symptoms:  · As documented above prolonged mastication  · Lingual mashing A-P oral transit with pooling of masticated food the valleculae  · Pooling of all items to the valleculae prior to the swallow. 3. Analysis of compensatory strategies  · Pt was unable to complete any other bolus formation/cohesion strategies. Dysphagia Score: Dysphagia Outcome Severity Scale: Level 6: Within functional limits/Modified independence    Aspiration/Penetration Risk:   Penetration-Aspiration Scale (PAS): 1 - Material does not enter the airway    Diet Recommendations:  Solid consistency: Regular   Liquid consistency: Thin   Medication administration: Meds in puree     Compensatory Swallow Strategies:    Upright as possible for all oral intake, Small bites/sips, Swallow 2 times per bite/sip, Remain upright for 30-45 minutes after meals    Therapy:  Requires SLP Intervention: No    Prognosis:  Prognosis for safe diet advancement: good  Consulted and agree with results and recommendations: Patient(caregiver)    Education:  Consulted and agree with results and recommendations: Patient(caregiver)  Patient Education Response: Verbalizes understanding, Needs reinforcement    Assessment: Test Data   Pain:  Pain Assessment  Patient Currently in Pain: Denies    General  Cognitive/Behavior  Behavior/Cognition  Behavior/Cognition: Alert; Cooperative;Pleasant mood(Poor Historian;[place]; followed 75% of 1 step commands; needed variable mild tactile cues)    Vision/Hearing  Vision  Vision: Impaired  Vision Exceptions: Wears glasses at all times  Hearing  Hearing: Exceptions to [facility]  Hearing Exceptions: Hard of hearing/hearing concerns    Consistencies Assessed     Reg solid, Dysphagia Soft and Bite-Sized (Dysphagia III), Dysphagia Minced and Moist (Dysphagia II), Dysphagia Pureed (Dysphagia I), Honey cup, Nectar cup, Nectar  teaspoon, Thin cup, Thin straw(isolated sips and consecutive sips in succession via cup Nehemias Derrick)    Positioning   Upright tin Videofluoroscopic Chair; lateral Plane View    Indicators of Oral Phase Dysfunction   Oral Phase - Major Contributing Deficits  Poor Mastication: Reg solid, Soft solid, Mechanical soft solid(Prolonged mastication (anterior munching/mashing pattern). Appeared functional)  Weak Lingual Manipulation: Reg solid, Soft solid, Mechanical soft solid(lingual mashing pattern and piecemeal swallows)  Reduced Posterior Propulsion: Reg solid, Soft solid, Mechanical soft solid(lingual mashing and piecemeal swallows)  Reduced Bolus Control: (inconsistent episodes of premature loss.)  Lingual / Palatal Residue: Reg solid, Soft solid, Mechanical soft solid(pt has good sensation and does clear spontaneously)  Piecemeal Swallowing: Reg solid, Soft solid, Mechanical soft solid, Puree  Premature Bolus Loss to Pharynx: (inconsistent across consistencies)  Delayed Trigger of Palatal Elevation: (inconsistent across consistencies)      Indicators of Pharyngeal Phase Dysfunction  Pharyngeal Phase - Major Contributing Deficits  Delayed Swallow Initiation: All(all items pool to the valleculae prior to the swallow)  Premature Spillage to Valleculae: (inconsistent across consistencies)      Upper Esophageal Phase   Upper Esophageal Screen  Esophageal Screen: (DNT).  No observed retrograde flow post swallow      MINUTES/CHARGES  SLP Individual Minutes  Time In: 3103  Time Out: 1340  Minutes: 45  Coded treatment time  0 Electronically signed by   Yasmani Mitchell. MS Randy,CCC,SLP [id number]  Speech and Language Pathologist  on 9/4/2020 at 1:44 PM

## 2020-10-07 RX ORDER — BENAZEPRIL HYDROCHLORIDE 20 MG/1
TABLET ORAL
Qty: 90 TABLET | Refills: 0 | Status: SHIPPED | OUTPATIENT
Start: 2020-10-07 | End: 2020-12-29

## 2020-10-08 RX ORDER — HYDROCHLOROTHIAZIDE 12.5 MG/1
CAPSULE, GELATIN COATED ORAL
Qty: 90 CAPSULE | Refills: 0 | Status: SHIPPED | OUTPATIENT
Start: 2020-10-08 | End: 2020-12-14

## 2020-10-08 RX ORDER — LEVOTHYROXINE SODIUM 0.07 MG/1
TABLET ORAL
Qty: 90 TABLET | Refills: 0 | Status: SHIPPED | OUTPATIENT
Start: 2020-10-08 | End: 2021-01-05

## 2020-10-08 RX ORDER — DONEPEZIL HYDROCHLORIDE 10 MG/1
TABLET, FILM COATED ORAL
Qty: 90 TABLET | Refills: 0 | Status: SHIPPED | OUTPATIENT
Start: 2020-10-08 | End: 2021-02-11

## 2020-12-02 ENCOUNTER — TELEPHONE (OUTPATIENT)
Dept: FAMILY MEDICINE CLINIC | Age: 85
End: 2020-12-02

## 2020-12-02 NOTE — TELEPHONE ENCOUNTER
Please fax over the forms from 09/10 and the one right before that as well. That is the medical necessity form. Fax both of those to [facility] we went back and forth ab out that form I know they got it. I will take care of the raised toilet seat.

## 2020-12-03 RX ORDER — UNDERPADS 23" X 36"
EACH MISCELLANEOUS
Qty: 100 EACH | Refills: 5 | Status: SHIPPED | OUTPATIENT
Start: 2020-12-03 | End: 2021-11-26

## 2020-12-03 NOTE — TELEPHONE ENCOUNTER
Patient calling stating jaciel is also needing a script for pull ups large size, pads, and strawberry ensure   [phone]  Please fax

## 2020-12-08 ENCOUNTER — TELEPHONE (OUTPATIENT)
Dept: FAMILY MEDICINE CLINIC | Age: 85
End: 2020-12-08

## 2020-12-09 RX ORDER — MEMANTINE HYDROCHLORIDE 5 MG-10 MG
KIT ORAL
Qty: 1 PACKAGE | Refills: 0 | Status: SHIPPED | OUTPATIENT
Start: 2020-12-09 | End: 2021-06-30

## 2020-12-11 ENCOUNTER — TELEPHONE (OUTPATIENT)
Dept: FAMILY MEDICINE CLINIC | Age: 85
End: 2020-12-11

## 2020-12-11 RX ORDER — FUROSEMIDE 40 MG/1
TABLET ORAL
Qty: 30 TABLET | Refills: 0 | Status: SHIPPED | OUTPATIENT
Start: 2020-12-11 | End: 2020-12-29

## 2020-12-14 ENCOUNTER — OFFICE VISIT (OUTPATIENT)
Dept: FAMILY MEDICINE CLINIC | Age: 85
End: 2020-12-14
Payer: COMMERCIAL

## 2020-12-14 VITALS
DIASTOLIC BLOOD PRESSURE: 74 MMHG | BODY MASS INDEX: 27.19 KG/M2 | SYSTOLIC BLOOD PRESSURE: 108 MMHG | TEMPERATURE: 97.4 F | WEIGHT: 144 LBS | RESPIRATION RATE: 16 BRPM | HEIGHT: 61 IN | HEART RATE: 80 BPM | OXYGEN SATURATION: 98 %

## 2020-12-14 DIAGNOSIS — R53.1 WEAKNESS: ICD-10-CM

## 2020-12-14 DIAGNOSIS — R60.0 BILATERAL LEG EDEMA: ICD-10-CM

## 2020-12-14 DIAGNOSIS — I10 ESSENTIAL HYPERTENSION: ICD-10-CM

## 2020-12-14 LAB
ANION GAP SERPL CALCULATED.3IONS-SCNC: 14 MMOL/L (ref 3–16)
BASOPHILS ABSOLUTE: 0.1 K/UL (ref 0–0.2)
BASOPHILS RELATIVE PERCENT: 1.1 %
BILIRUBIN, POC: NEGATIVE
BLOOD URINE, POC: NEGATIVE
BUN BLDV-MCNC: 21 MG/DL (ref 7–20)
CALCIUM SERPL-MCNC: 9.8 MG/DL (ref 8.3–10.6)
CHLORIDE BLD-SCNC: 99 MMOL/L (ref 99–110)
CLARITY, POC: CLEAR
CO2: 28 MMOL/L (ref 21–32)
COLOR, POC: YELLOW
CREAT SERPL-MCNC: 1.2 MG/DL (ref 0.6–1.2)
EOSINOPHILS ABSOLUTE: 0.2 K/UL (ref 0–0.6)
EOSINOPHILS RELATIVE PERCENT: 3.2 %
GFR AFRICAN AMERICAN: 51
GFR NON-AFRICAN AMERICAN: 42
GLUCOSE BLD-MCNC: 79 MG/DL (ref 70–99)
GLUCOSE URINE, POC: NEGATIVE
HCT VFR BLD CALC: 35.8 % (ref 36–48)
HEMOGLOBIN: 11.9 G/DL (ref 12–16)
KETONES, POC: NEGATIVE
LEUKOCYTE EST, POC: ABNORMAL
LYMPHOCYTES ABSOLUTE: 1.9 K/UL (ref 1–5.1)
LYMPHOCYTES RELATIVE PERCENT: 33.7 %
MCH RBC QN AUTO: 31.6 PG (ref 26–34)
MCHC RBC AUTO-ENTMCNC: 33.3 G/DL (ref 31–36)
MCV RBC AUTO: 94.9 FL (ref 80–100)
MONOCYTES ABSOLUTE: 0.6 K/UL (ref 0–1.3)
MONOCYTES RELATIVE PERCENT: 11 %
NEUTROPHILS ABSOLUTE: 2.9 K/UL (ref 1.7–7.7)
NEUTROPHILS RELATIVE PERCENT: 51 %
NITRITE, POC: NEGATIVE
PDW BLD-RTO: 14.9 % (ref 12.4–15.4)
PH, POC: 5
PLATELET # BLD: 303 K/UL (ref 135–450)
PMV BLD AUTO: 9.6 FL (ref 5–10.5)
POTASSIUM SERPL-SCNC: 4.1 MMOL/L (ref 3.5–5.1)
PROTEIN, POC: NEGATIVE
RBC # BLD: 3.77 M/UL (ref 4–5.2)
SODIUM BLD-SCNC: 141 MMOL/L (ref 136–145)
SPECIFIC GRAVITY, POC: 1.01
UROBILINOGEN, POC: ABNORMAL
WBC # BLD: 5.6 K/UL (ref 4–11)

## 2020-12-14 PROCEDURE — G8427 DOCREV CUR MEDS BY ELIG CLIN: HCPCS | Performed by: FAMILY MEDICINE

## 2020-12-14 PROCEDURE — 1036F TOBACCO NON-USER: CPT | Performed by: FAMILY MEDICINE

## 2020-12-14 PROCEDURE — 1090F PRES/ABSN URINE INCON ASSESS: CPT | Performed by: FAMILY MEDICINE

## 2020-12-14 PROCEDURE — 1123F ACP DISCUSS/DSCN MKR DOCD: CPT | Performed by: FAMILY MEDICINE

## 2020-12-14 PROCEDURE — G8417 CALC BMI ABV UP PARAM F/U: HCPCS | Performed by: FAMILY MEDICINE

## 2020-12-14 PROCEDURE — 81002 URINALYSIS NONAUTO W/O SCOPE: CPT | Performed by: FAMILY MEDICINE

## 2020-12-14 PROCEDURE — 99214 OFFICE O/P EST MOD 30 MIN: CPT | Performed by: FAMILY MEDICINE

## 2020-12-14 PROCEDURE — 4040F PNEUMOC VAC/ADMIN/RCVD: CPT | Performed by: FAMILY MEDICINE

## 2020-12-14 PROCEDURE — G8484 FLU IMMUNIZE NO ADMIN: HCPCS | Performed by: FAMILY MEDICINE

## 2020-12-14 NOTE — PATIENT INSTRUCTIONS
INSTRUCTIONS  NEXT APPOINTMENT: Please schedule check-up in 6 months. · PLEASE TAKE THIS FORM TO CHECK-OUT WINDOW TO SCHEDULE NEXT VISIT. · PLEASE GET BLOODWORK DRAWN TODAY ON FIRST FLOOR in 170. Take orders with you. RESULTS- most blood tests back in couple days. We will call you if any problems. If bloodwork good, you will get letter in mail or notified thru [address] (if signed up) within 2 weeks. If you do not, please call office. · OK to try GasX. · In 2 days, try lasix every other day or half tab daily. whichever works best.  · OK to try OTC Mucinex plain to loosen secretion. Patient Education       GAS     Everyone has gas and eliminates it by burping or passing it through the rectum. However, many people think they have too much gas when they really have normal amounts. Most people produce about 1 to 4 pints a day and pass gas about 14 times a day. Gas is made primarily of odorless vaporscarbon dioxide, oxygen, nitrogen, hydrogen, and sometimes methane. The unpleasant odor of flatulence, the gas that passes through the rectum, comes from bacteria in the large intestine that release small amounts of gases containing sulfur. Although having gas is common, it can be uncomfortable and embarrassing. Understanding causes, ways to reduce symptoms, and treatment will help most people find relief. What causes gas? Gas in the digestive tractthe esophagus, stomach, small intestine, and large intestinecomes from two sources:  swallowed air   normal breakdown of certain undigested foods by harmless bacteria naturally present in the large intestine, also called the colon     Swallowed Air  Aerophagia, or air swallowing, is a common cause of gas in the stomach. Everyone swallows small amounts of air when eating and drinking. However, eating or drinking rapidly, chewing gum, smoking, or wearing loose dentures can cause some people to take in more air. Burping, or belching, is the way most swallowed airwhich contains nitrogen, oxygen, and carbon dioxideleaves the stomach. The remaining gas moves into the small intestine, where it is partially absorbed. A small amount travels into the large intestine for release through the rectum. The stomach also releases carbon dioxide when stomach acid mixes with the bicarbonate in digestive juices, but most of this gas is absorbed into the bloodstream and does not enter the large intestine. Breakdown of Undigested Foods  The body does not digest and absorb some carbohydratesthe sugar, starches, and fiber found in many foodsin the small intestine because of a shortage or absence of certain enzymes that aid digestion. This undigested food then passes from the small intestine into the large intestine, where normal, harmless bacteria break down the food, producing hydrogen, carbon dioxide, and, in about one-third of all people, methane. Eventually these gases exit through the rectum. People who make methane do not necessarily pass more gas or have unique symptoms. A person who produces methane will have stools that consistently float in water. Research has not shown why some people produce methane and others do not. Foods that produce gas in one person may not cause gas in another. Some common bacteria in the large intestine can destroy the hydrogen that other bacteria produce. The balance of the two types of bacteria may explain why some people have more gas than others. Which foods cause gas? Most foods that contain carbohydrates can cause gas. By contrast, fats and proteins cause little gas. Sugars  The sugars that cause gas are raffinose, lactose, fructose, and sorbitol. Raffinose. Beans contain large amounts of this complex sugar. Smaller amounts are found in cabbage, brussels sprouts, broccoli, asparagus, other vegetables, and whole grains. Lactose. Lactose is the natural sugar in milk. It is also found in milk products, such as cheese and ice cream, and processed foods, such as bread, cereal, and salad dressing. Many people, particularly those of , , or  background, normally have low levels of lactase, the enzyme needed to digest lactose, after childhood. Also, as people age, their enzyme levels decrease. As a result, over time people may experience increasing amounts of gas after eating food containing lactose. Fructose. Fructose is naturally present in onions, artichokes, pears, and wheat. It is also used as a sweetener in some soft drinks and fruit drinks. Sorbitol. Sorbitol is a sugar found naturally in fruits, including apples, pears, peaches, and prunes. It is also used as an artificial sweetener in many dietetic foods and sugar-free candies and gums. Starches  Most starches, including potatoes, corn, pasta, and wheat, produce gas as they are broken down in the large intestine. Rice is the only starch that does not cause gas. Fiber  Many foods contain soluble and insoluble fiber. Soluble fiber dissolves easily in water and takes on a soft, gel-like texture in the intestines. Found in oat bran, beans, peas, and most fruits, soluble fiber is not broken down until it reaches the large intestine, where digestion causes gas. Insoluble fiber, on the other hand, passes essentially unchanged through the intestines and produces little gas. Wheat bran and some vegetables contain this kind of fiber. What are some symptoms and problems of gas? The most common symptoms of gas are flatulence, abdominal bloating, abdominal pain, and belching. However, not everyone experiences these symptoms. The type and degree of symptoms probably depends on how much gas the body produces, how many fatty acids the body absorbs, and a person's sensitivity to gas in the large intestine.     Belching An occasional belch during or after meals is normal and releases gas when the stomach is full of food. However, people who belch frequently may be swallowing too much air and releasing it before the air enters the stomach. Sometimes a person with chronic belching may have an upper gastrointestinal (GI) disorder, such as peptic ulcer disease, gastroesophageal reflux disease (GERD), or gastroparesis, also called delayed gastric emptying. Sometimes people believe that swallowing air and releasing it will relieve the discomfort of these disorders, and they may intentionally or unintentionally develop a habit of belching to relieve discomfort. Gas-bloat syndrome may occur after fundoplication surgery to correct GERD. The surgery creates a one-way valve between the esophagus and stomach that allows food and gas to enter the stomach but often prevents normal belching and the ability to vomit. It occurs in about 10 percent of people who have this surgery but may improve with time. Flatulence  Another common complaint is too much flatulence. However, most people do not realize that passing gas 14 to 23 times a day is normal. Too much gas may be the result of carbohydrate malabsorption. Abdominal Bloating  Many people believe that too much gas causes abdominal bloating. However, people who complain of bloating from gas often have normal amounts and distribution of gas. They may just be unusually aware of gas in the digestive tract. Doctors believe that bloating is usually the result of an intestinal disorder, such as irritable bowel syndrome (IBS). The cause of IBS is unknown but may involve abnormal movements and contractions of intestinal muscles and increased pain sensitivity in the intestines. These disorders may give a sensation of bloating because of increased sensitivity to gas. Experience has shown that the most common ways to reduce the discomfort of gas are changing diet, taking medicines, and reducing the amount of air swallowed. Diet  Health professionals may tell people to eat fewer foods that cause gas. However, for some people this may mean cutting out healthy foods, such as fruits and vegetables, whole grains, and milk products. Health professionals may also suggest limiting high-fat foods to reduce bloating and discomfort. Less fat in the diet helps the stomach empty faster, allowing gases to move into the small intestine. Unfortunately, the amount of gas caused by certain foods varies from person to person. Effective dietary changes depend on learning through trial and error how much of the offending foods one can handle. Nonprescription Medicines  Digestive enzymes, available as over-the-counter supplements, help digest carbohydrates and may allow people to eat foods that normally cause gas. The enzyme lactase, which aids with lactose digestion, is available in caplet and chewable tablet form without a prescription; Lactaid and Lactrase are two common brands. Taking lactase supplements just before eating helps digest foods that contain lactose. Also, lactose-reduced milk and other products, such as Lactaid and Dairy Ease, are available at [employer]. Beano, an over-the-counter digestive aid, contains the sugar-digesting enzyme that the body lacks to digest the sugar in beans and many vegetables. The enzyme comes in liquid and tablet form. Five drops are added per serving or one tablet is swallowed just before eating to break down the gas-producing sugars. Beano has no effect on gas caused by lactose or fiber. Prescription Medicines  Doctors may prescribe medicines to help reduce symptoms, especially for people with a disorder such as IBS.      Reducing Swallowed Air

## 2020-12-14 NOTE — TELEPHONE ENCOUNTER
Received text from answering service on 12/11 and 12/12 RE dementia worse, not sleeping. Leg swelling, urinating a lot. Called in Lasix to try for 5 days.

## 2020-12-14 NOTE — PROGRESS NOTES
PROBLEM VISIT NOTE     Subjective:     Chief Complaint   Patient presents with    Fall     Eleanor Shaw is a 80 y.o. female   who presents had a couple of falls. Landed on her bottom. Pt also needs a raised toilet seat. She also needs a pillow for lift chair to help her get out of it. No injuries since her fall. Pt has mucus in the morning she can't get rid of normally only when she first wakes up. It's gotten thicker. Swelling better. Lasix makes her urinate in a smaller time period so less frequent later in day. Slept better with melatonin. DOP says urine clear. No odor. Patient's medications, allergies, past medical, and social histories were reviewed and updated as appropriate (see below). Review of Systems   General ROS: fever? No,    Night sweats? No  Endocrine ROS:malaise/lethargy? No   Unexpected weight changes? No  Respiratory ROS: cough? No   Shortness of breath? No  Cardiovascular ROS:chest pain? No   Shortness of breath with exertion? No  Gastrointestinal ROS: abdominal pain? No   Change in stools? No  Genito-Urinary ROS: painful urination? No   Trouble voiding? No  Neurological ROS: TIA or stroke symptoms? No   Numbness/tingling in feet? No  Health Maintenance Due   Topic Date Due    Shingles Vaccine (1 of 2) [birth year]11/1981    DTaP/Tdap/Td vaccine (2 - Td) 08/13/2018    Annual Wellness Visit (AWV)  05/29/2019    TSH testing  02/26/2020    Flu vaccine (1) 09/01/2020         *Chief complaint, HPI and History provided by the medical assistant has been reviewed and verified by provider Didier Bishop MD    HISTORY:  Patient's medications, allergies, past medical, and social histories were reviewed and updated as appropriate.      CHART REVIEW  Health Maintenance   Topic Date Due    Shingles Vaccine (1 of 2) 01/11/1981    DTaP/Tdap/Td vaccine (2 - Td) 08/13/2018    Annual Wellness Visit (AWV)  05/29/2019    TSH testing  02/26/2020    Flu vaccine (1) 09/01/2020  Pneumococcal 65+ years Vaccine (1 of 1 - PPSV23) 07/11/2023 (Originally 6/23/2014)    Potassium monitoring  07/28/2021    Creatinine monitoring  07/28/2021    Hepatitis A vaccine  Aged Out    Hepatitis B vaccine  Aged Out    Hib vaccine  Aged Out    Meningococcal (ACWY) vaccine  Aged Out     The ASCVD Risk score (Arlet Nicole, et al., 2013) failed to calculate for the following reasons: The 2013 ASCVD risk score is only valid for ages 36 to 78  Prior to Visit Medications    Medication Sig Taking? Authorizing Provider   Misc. Devices MISC Riser Commode lift Yes Alex Israel MD   Elastic Bandages & Supports (TRUFORM SUPPORT SOCK 20-30MMHG) MISC Bilateral knee high. With/without zipper. Take Rx to medical supply such as [facility], phone [phone] (has zipper) OR [facility] [phone] (no zipper).  Product/Procedure Code:   I83.10 (chronic venous stasis), R60.0 (edema) Yes Alex Israel MD   furosemide (LASIX) 40 MG tablet Take 1 in AM as needed for edema in place of hydrochlorothiazide Yes Alex Israel MD   memantine (NAMENDA TITRATION NORA) 28 x 5 MG & 21 x 10 MG tablet pack 5 mg/day for =1 week; 5 mg twice daily for =1 week; 15 mg/day given in 5 mg and 10 mg  doses for =1 week; then 10 mg twice daily Yes Alex Israel MD   Nutritional Supplements (ENSURE COMPLETE) LIQD Take 1 Bottle by mouth 2 times daily Yes Alex Israel MD   Incontinence Supply Disposable (INCONTINENCE BRIEF MEDIUM) MISC four times daily as needed incontinence Yes Alex Israel MD   donepezil (ARICEPT) 10 MG tablet TAKE 1 TABLET BY MOUTH EVERY DAY AT NIGHT Yes Alex Israel MD   levothyroxine (SYNTHROID) 75 MCG tablet TAKE 1 TABLET BY MOUTH EVERY DAY Yes Alex Israel MD   benazepril (LOTENSIN) 20 MG tablet TAKE 1 TABLET BY MOUTH EVERY DAY Yes Alex Israel MD albuterol sulfate  (90 Base) MCG/ACT inhaler Inhale 2 puffs into the lungs 4 times daily as needed for Wheezing Yes Héctor Chan MD   [address] Yes Héctor Chan MD   Respiratory Therapy Supplies (ONE FLOW SPIROMETER) EDNA Use as needed Yes Héctor Chan MD   Spacer/Aero-Holding Chambers EDNA 1 Device by Does not apply route daily Yes Tavia Garcia, APRN - CNP   [facility]. Devices (MATTRESS PAD) MISC [employer]; Egg Crate mattress Yes Yasmeen Rosenbaum MD   [facility]. Devices ([address] [place] [address] Blvd) [facility] With openning to fit over toilet Yes Héctor Chan MD   [facility]. Devices (TRANSPORT CHAIR) MISC Lightweight, collapsible Yes Héctor Chan MD   Omega-3 Fatty Acids (FISH OIL) 1000 MG CAPS Take 3 capsules by mouth daily Yes Héctor Chan MD   Calcium Carb-Cholecalciferol (CALCIUM 500/D) 500-400 MG-UNIT CHEW Take 1 tablet by mouth daily. Yes Héctor Chan MD   Multiple Minerals-Vitamins (MIKE-MAG-ZINC-D) TABS Take 1 tablet by mouth. Yes Héctor Chan MD   cetirizine (ZYRTEC) 10 MG tablet Take 10 mg by mouth daily. Yes Historical Provider, MD   aspirin 325 MG tablet Take 325 mg by mouth daily. Yes Historical Provider, MD   dorzolamide-timolol (COSOPT) 22.3-6.8 MG/ML ophthalmic solution 1 drop 2 times daily.  Dr. Lolly Llamas - ROSALINO Yes Historical Provider, MD      Family History   Problem Relation Age of Onset    Breast Cancer Daughter      Social History     Tobacco Use    Smoking status: Never Smoker    Smokeless tobacco: Never Used    Tobacco comment: [facility]   Substance Use Topics    Alcohol use: No    Drug use: No      LAST LABS  Cholesterol, Total   Date Value Ref Range Status   02/26/2019 211 (H) 0 - 199 mg/dL Final     LDL Calculated   Date Value Ref Range Status   02/26/2019 123 (H) <100 mg/dL Final     HDL   Date Value Ref Range Status   02/26/2019 63 (H) 40 - 60 mg/dL Final   05/03/2012 49 40 - 60 mg/dl Final     Triglycerides   Date Value Ref Range Status 02/26/2019 123 0 - 150 mg/dL Final     Lab Results   Component Value Date    GLUCOSE 88 07/28/2020     Lab Results   Component Value Date     07/28/2020    K 4.1 07/28/2020    CREATININE 0.9 07/28/2020     Lab Results   Component Value Date    WBC 5.5 07/28/2020    HGB 12.2 07/28/2020    HCT 36.2 07/28/2020    MCV 95.5 07/28/2020     07/28/2020     Lab Results   Component Value Date    ALT 14 07/28/2020    AST 20 07/28/2020    ALKPHOS 58 07/28/2020    BILITOT 0.3 07/28/2020     TSH (uIU/mL)   Date Value   02/26/2019 1.30     Lab Results   Component Value Date    LABA1C 5.6 02/26/2019     Objective:   PHYSICAL EXAM  /74 (Site: Right Upper Arm, Position: Sitting, Cuff Size: Medium Adult)   Pulse 80   Temp 97.4 °F (36.3 °C) (Temporal)   Resp 16   Ht 5' 1\" (1.549 m)   Wt 144 lb (65.3 kg)   SpO2 98%   BMI 27.21 kg/m²   BP Readings from Last 5 Encounters:   12/14/20 108/74   07/28/20 126/70   06/17/19 128/60   02/26/19 118/78   11/20/18 128/60     Wt Readings from Last 5 Encounters:   12/14/20 144 lb (65.3 kg)   07/28/20 136 lb 3.2 oz (61.8 kg)   06/17/19 126 lb (57.2 kg)   02/26/19 129 lb (58.5 kg)   11/20/18 133 lb (60.3 kg)      GENERAL:   · well-developed, well-nourished, alert, no distress. EYES:   · External findings: lids and lashes normal and conjunctivae and sclerae normal  · Eyes: no periorbital cellulitis. ENT:   · External nose and ears appear normal  LUNGS:    · Breathing unlabored  · clear to auscultation bilaterally and good air movement  CARDIOVASC:   · Regular rate and rhythm, S1, S2 normal. No murmur, click, rub or gallop  · LEGS:  Lower extremity edema: Trace  ankle  SKIN: warm and dry      Assessment and Plan:      Diagnosis Orders   1. Essential hypertension  Basic Metabolic Panel   2. Bilateral leg edema  Basic Metabolic Panel    Elastic Bandages & Supports (TRUFORM SUPPORT SOCK 20-30MMHG) MISC   3. Leg pain, bilateral  Misc.  Devices MISC Elastic Bandages & Supports (TRUFORM SUPPORT SOCK 20-30MMHG) MISC   4. Weakness  CBC Auto Differential    Misc. Devices MISC   5. Urinary frequency  POCT Urinalysis no Micro    Culture, Urine   Improved after treatment over weekend. . Plan as above and below. INSTRUCTIONS  NEXT APPOINTMENT: Please schedule check-up in 6 months. · PLEASE TAKE THIS FORM TO CHECK-OUT WINDOW TO SCHEDULE NEXT VISIT. · PLEASE GET BLOODWORK DRAWN TODAY ON FIRST FLOOR in 170. Take orders with you. RESULTS- most blood tests back in couple days. We will call you if any problems. If bloodwork good, you will get letter in mail or notified thru [address] (if signed up) within 2 weeks. If you do not, please call office. · OK to try GasX. · In 2 days, try lasix every other day or half tab daily. whichever works best.  · OK to try OTC Mucinex plain to loosen secretion.

## 2020-12-15 LAB — URINE CULTURE, ROUTINE: NORMAL

## 2020-12-17 ENCOUNTER — TELEPHONE (OUTPATIENT)
Dept: FAMILY MEDICINE CLINIC | Age: 85
End: 2020-12-17

## 2020-12-17 NOTE — TELEPHONE ENCOUNTER
Called dop and she said the water weight is coming off the pt and she is breathing much easier. I told her that is good she should keep taking that. She wanted to know if she should go back on the HCTZ I looked at the after visit summary and it said for pt not to take that so I let dop know that she should not go back on that unless dr Chayo Morales thinks she should. So dr Chayo Morales just to clarify with you she should keep taking the lasix and stay off the HCTZ correct?

## 2020-12-17 NOTE — TELEPHONE ENCOUNTER
DOP  called in has medication questions   It is called furosemide 40 MG she is on this to pull the water off wants instructions   DOP wants to know how long to take this and when to go back to hydrocholoride

## 2020-12-17 NOTE — TELEPHONE ENCOUNTER
Would just take the lasix. Can try half tab daily or every other day. If swelling returns go back to whole tab daily for 3 days at a time.

## 2020-12-29 RX ORDER — FUROSEMIDE 40 MG/1
TABLET ORAL
Qty: 30 TABLET | Refills: 0 | Status: SHIPPED | OUTPATIENT
Start: 2020-12-29 | End: 2021-01-29

## 2020-12-29 RX ORDER — BENAZEPRIL HYDROCHLORIDE 20 MG/1
TABLET ORAL
Qty: 90 TABLET | Refills: 1 | Status: SHIPPED | OUTPATIENT
Start: 2020-12-29 | End: 2021-06-28

## 2021-01-04 RX ORDER — MEMANTINE HYDROCHLORIDE 10 MG/1
10 TABLET ORAL 2 TIMES DAILY
Qty: 180 TABLET | Refills: 1 | Status: SHIPPED | OUTPATIENT
Start: 2021-01-04 | End: 2021-06-30

## 2021-01-04 RX ORDER — MEMANTINE HYDROCHLORIDE 10 MG/1
10 TABLET ORAL 2 TIMES DAILY
Qty: 180 TABLET | Refills: 1 | OUTPATIENT
Start: 2021-01-04

## 2021-01-05 DIAGNOSIS — I10 ESSENTIAL HYPERTENSION: ICD-10-CM

## 2021-01-05 DIAGNOSIS — E03.9 ACQUIRED HYPOTHYROIDISM: ICD-10-CM

## 2021-01-05 RX ORDER — LEVOTHYROXINE SODIUM 0.07 MG/1
TABLET ORAL
Qty: 90 TABLET | Refills: 0 | Status: SHIPPED | OUTPATIENT
Start: 2021-01-05 | End: 2021-05-07

## 2021-01-05 RX ORDER — HYDROCHLOROTHIAZIDE 12.5 MG/1
CAPSULE, GELATIN COATED ORAL
Qty: 90 CAPSULE | Refills: 0 | OUTPATIENT
Start: 2021-01-05

## 2021-01-29 ENCOUNTER — TELEPHONE (OUTPATIENT)
Dept: FAMILY MEDICINE CLINIC | Age: 86
End: 2021-01-29

## 2021-01-29 RX ORDER — FUROSEMIDE 40 MG/1
TABLET ORAL
Qty: 30 TABLET | Refills: 0 | Status: SHIPPED | OUTPATIENT
Start: 2021-01-29 | End: 2021-02-23

## 2021-01-29 NOTE — TELEPHONE ENCOUNTER
Ventricular Rate : 85  Atrial Rate : 85  P-R Interval : 178  QRS Duration : 78  Q-T Interval : 364  QTC Calculation(Bazett) : 433  P Axis : 41  R Axis : 7  T Axis : 26  Diagnosis : *** Poor data quality, interpretation may be adversely affected  Normal sinus rhythm  Normal ECG    Confirmed by Jian Agustin MD ([id number]) on 12/28/2019 9:08:05 AM   Yes vaccine and both doses

## 2021-01-29 NOTE — TELEPHONE ENCOUNTER
DOP called in wants to know should the pt get a covid shot? What kind? If so should she get both sandy?  She has a appointment Monday

## 2021-01-30 DIAGNOSIS — I10 ESSENTIAL HYPERTENSION: ICD-10-CM

## 2021-02-01 RX ORDER — HYDROCHLOROTHIAZIDE 12.5 MG/1
CAPSULE, GELATIN COATED ORAL
Qty: 90 CAPSULE | Refills: 0 | OUTPATIENT
Start: 2021-02-01

## 2021-02-23 RX ORDER — FUROSEMIDE 40 MG/1
TABLET ORAL
Qty: 30 TABLET | Refills: 2 | Status: SHIPPED | OUTPATIENT
Start: 2021-02-23 | End: 2021-05-26

## 2021-03-15 ENCOUNTER — TELEPHONE (OUTPATIENT)
Dept: FAMILY MEDICINE CLINIC | Age: 86
End: 2021-03-15

## 2021-05-07 ENCOUNTER — TELEPHONE (OUTPATIENT)
Dept: FAMILY MEDICINE CLINIC | Age: 86
End: 2021-05-07

## 2021-05-07 DIAGNOSIS — I69.351 HEMIPLEGIA OF RIGHT DOMINANT SIDE AS LATE EFFECT OF CEREBRAL INFARCTION, UNSPECIFIED HEMIPLEGIA TYPE (HCC): Primary | ICD-10-CM

## 2021-05-07 DIAGNOSIS — E03.9 ACQUIRED HYPOTHYROIDISM: ICD-10-CM

## 2021-05-07 DIAGNOSIS — F03.90 DEMENTIA WITHOUT BEHAVIORAL DISTURBANCE, UNSPECIFIED DEMENTIA TYPE: ICD-10-CM

## 2021-05-07 RX ORDER — LEVOTHYROXINE SODIUM 0.07 MG/1
TABLET ORAL
Qty: 90 TABLET | Refills: 0 | Status: SHIPPED | OUTPATIENT
Start: 2021-05-07 | End: 2021-07-28

## 2021-05-07 NOTE — TELEPHONE ENCOUNTER
Any skin breakdown? She means buttox not rectum? Is she in [facility] or chair? Feet elevated? Shifting position every hour?

## 2021-05-26 RX ORDER — FUROSEMIDE 40 MG/1
TABLET ORAL
Qty: 90 TABLET | Refills: 0 | Status: SHIPPED | OUTPATIENT
Start: 2021-05-26 | End: 2022-01-10

## 2021-06-16 ENCOUNTER — OFFICE VISIT (OUTPATIENT)
Dept: FAMILY MEDICINE CLINIC | Age: 86
End: 2021-06-16
Payer: COMMERCIAL

## 2021-06-16 VITALS
SYSTOLIC BLOOD PRESSURE: 110 MMHG | OXYGEN SATURATION: 98 % | BODY MASS INDEX: 27.21 KG/M2 | DIASTOLIC BLOOD PRESSURE: 68 MMHG | HEART RATE: 83 BPM | RESPIRATION RATE: 16 BRPM | HEIGHT: 61 IN

## 2021-06-16 DIAGNOSIS — R73.03 PREDIABETES: ICD-10-CM

## 2021-06-16 DIAGNOSIS — I69.351 HEMIPLEGIA OF RIGHT DOMINANT SIDE AS LATE EFFECT OF CEREBRAL INFARCTION, UNSPECIFIED HEMIPLEGIA TYPE (HCC): ICD-10-CM

## 2021-06-16 DIAGNOSIS — Z86.73 HISTORY OF CVA (CEREBROVASCULAR ACCIDENT): ICD-10-CM

## 2021-06-16 DIAGNOSIS — H91.90 HEARING LOSS, UNSPECIFIED HEARING LOSS TYPE, UNSPECIFIED LATERALITY: ICD-10-CM

## 2021-06-16 DIAGNOSIS — F03.90 DEMENTIA WITHOUT BEHAVIORAL DISTURBANCE, UNSPECIFIED DEMENTIA TYPE: ICD-10-CM

## 2021-06-16 DIAGNOSIS — I10 ESSENTIAL HYPERTENSION: ICD-10-CM

## 2021-06-16 DIAGNOSIS — Z00.00 ROUTINE GENERAL MEDICAL EXAMINATION AT A HEALTH CARE FACILITY: Primary | ICD-10-CM

## 2021-06-16 DIAGNOSIS — Z91.81 AT HIGH RISK FOR FALLS: ICD-10-CM

## 2021-06-16 DIAGNOSIS — E78.5 HYPERLIPIDEMIA WITH TARGET LDL LESS THAN 100: ICD-10-CM

## 2021-06-16 DIAGNOSIS — E02 SUBCLINICAL IODINE-DEFICIENCY HYPOTHYROIDISM: ICD-10-CM

## 2021-06-16 LAB — TSH SERPL DL<=0.05 MIU/L-ACNC: 2.71 UIU/ML (ref 0.27–4.2)

## 2021-06-16 PROCEDURE — G0439 PPPS, SUBSEQ VISIT: HCPCS | Performed by: FAMILY MEDICINE

## 2021-06-16 PROCEDURE — 4040F PNEUMOC VAC/ADMIN/RCVD: CPT | Performed by: FAMILY MEDICINE

## 2021-06-16 PROCEDURE — 1123F ACP DISCUSS/DSCN MKR DOCD: CPT | Performed by: FAMILY MEDICINE

## 2021-06-16 RX ORDER — PREDNISOLONE ACETATE 10 MG/ML
SUSPENSION/ DROPS OPHTHALMIC
COMMUNITY
Start: 2021-04-02 | End: 2022-05-09

## 2021-06-16 RX ORDER — BRIMONIDINE TARTRATE 2 MG/ML
SOLUTION/ DROPS OPHTHALMIC
COMMUNITY
Start: 2021-06-04

## 2021-06-16 ASSESSMENT — PATIENT HEALTH QUESTIONNAIRE - PHQ9
SUM OF ALL RESPONSES TO PHQ QUESTIONS 1-9: 0
SUM OF ALL RESPONSES TO PHQ QUESTIONS 1-9: 0
SUM OF ALL RESPONSES TO PHQ9 QUESTIONS 1 & 2: 0
1. LITTLE INTEREST OR PLEASURE IN DOING THINGS: 0
SUM OF ALL RESPONSES TO PHQ QUESTIONS 1-9: 0
2. FEELING DOWN, DEPRESSED OR HOPELESS: 0

## 2021-06-16 ASSESSMENT — LIFESTYLE VARIABLES: HOW OFTEN DO YOU HAVE A DRINK CONTAINING ALCOHOL: 0

## 2021-06-16 NOTE — PROGRESS NOTES
Medicare Annual Wellness Visit  Name: Raza Calero  YOB: 1931  Age: 80 y.o. Sex: female  MRN: <[id number]>     Date of Service:  6/16/2021    Chief Complaint:   Raza Calero is a 80 y.o. female who presents for Medicare Annual Wellness Visit and check-up for:  1. Routine general medical examination at a health care facility    2. Subclinical iodine-deficiency hypothyroidism    3. Essential hypertension    4. Hyperlipidemia with target LDL less than 100    5. History of CVA (cerebrovascular accident)    6. Hemiplegia of right dominant side as late effect of cerebral infarction, unspecified hemiplegia type ([address]    7. Prediabetes    8. Dementia without behavioral disturbance, unspecified dementia type ([address]    9. At high risk for falls    10. Hearing loss, unspecified hearing loss type, unspecified laterality      HPI    Chief Complaint   Patient presents with    Medicare AWV   Complaints: dop states pt needs a hearing test, needs to talk about hearing aids. Is seeing eye dr tomorrow  - taking meds, no issues. Review of Systems   General ROS: fever? No,    Night sweats? No  Ophthalmic ROS: change in vision? No  Endocrine ROS: fatigue? No   Unexpected weight changes? No  Hematologic/Lymphatic: easy bruising? No   Swollen lymph nodes? No  ENT ROS: headaches? No   Sore throat? No  Respiratory ROS: cough? No   Wheezing? No  Cardiovascular ROS: chest pain? No   Shortness of breath? No  Gastrointestinal ROS: abdominal pain? No   Change in stools? No  Genito-Urinary ROS: painful urination? No   Trouble urinating? No  Musculoskeletal ROS: trouble walking? No   Joint pain? Yes  Neurological ROS: TIA or stroke symptoms? No   Numbness/tingling? No  Dermatological ROS: rash? No   Changes in skin spots?   No    Health Maintenance Due   Topic Date Due    Shingles Vaccine (1 of 2) Never done    DTaP/Tdap/Td vaccine (2 - Td or Tdap) 08/13/2018    Annual Wellness Visit (AWV)  Never done    TSH testing  02/26/2020     *Chief complaint, HPI, History and ROS provided by the medical assistant has been reviewed and verified by provider- Valeria Salinas MD    HISTORY:  Patient's medications, allergies, past medical, and social histories were reviewed and updated as appropriate. CHART REVIEW  Health Maintenance   Topic Date Due    Shingles Vaccine (1 of 2) Never done    DTaP/Tdap/Td vaccine (2 - Td or Tdap) 08/13/2018    Annual Wellness Visit (AWV)  Never done    TSH testing  02/26/2020    Pneumococcal 65+ years Vaccine (1 of 1 - PPSV23) 07/11/2023 (Originally 6/23/2014)    Flu vaccine (Season Ended) 09/01/2021    Potassium monitoring  12/14/2021    Creatinine monitoring  12/14/2021    COVID-19 Vaccine  Completed    Hepatitis A vaccine  Aged Out    Hepatitis B vaccine  Aged Out    Hib vaccine  Aged Out    Meningococcal (ACWY) vaccine  Aged Out     The ASCVD Risk score (Zahraa Kinney, et al., 2013) failed to calculate for the following reasons: The 2013 ASCVD risk score is only valid for ages 36 to 78  Prior to Visit Medications    Medication Sig Taking? Authorizing Provider   prednisoLONE acetate (PRED FORTE) 1 % ophthalmic suspension INSTILL 1 DROP BY OPHTHALMIC ROUTE 4 TIMES EVERY DAY IN THE LEFT EYE Yes Historical Provider, MD   brimonidine (ALPHAGAN) 0.2 % ophthalmic solution INSTILL 1 DROP INTO LEFT EYE EVERY 12 HOURS Yes Historical Provider, MD   furosemide (LASIX) 40 MG tablet TAKE 1TAB IN AM AS NEEDED FOR EDEMA IN PLACE OF HYDROCHLOROTHIAZIDE Yes Valeria Salinas MD   Misc.  Devices (FOAM CUSHION) MISC For chair Yes Valeria Salinas MD   levothyroxine (SYNTHROID) 75 MCG tablet TAKE 1 TABLET BY MOUTH EVERY DAY Yes Valeria Salinas MD   donepezil (ARICEPT) 10 MG tablet TAKE 1 TABLET BY MOUTH EVERY DAY AT NIGHT Yes Valeria Salinas MD   memantine (NAMENDA) 10 MG tablet Take 1 tablet by mouth 2 times daily Yes Valeria Salinas MD   benazepril (LOTENSIN) 20 MG tablet TAKE 1 Benjy Lapping Yes Francisca Guerrero MD   Misc. Devices MISC Riser Commode lift Yes Francisca Guerrero MD   Elastic Bandages & Supports (TRUFORM SUPPORT SOCK 20-30MMHG) MISC Bilateral knee high. With/without zipper. Take Rx to medical supply such as [facility]order[facility], phone [phone] (has zipper) OR Philipp Low  [phone] (no zipper). Product/Procedure Code:   I83.10 (chronic venous stasis), R60.0 (edema) Yes Francisca Guerrero MD   memantine (NAMENDA TITRATION NORA) 28 x 5 MG & 21 x 10 MG tablet pack 5 mg/day for =1 week; 5 mg twice daily for =1 week; 15 mg/day given in 5 mg and 10 mg  doses for =1 week; then 10 mg twice daily Yes Francisca Guerrero MD   Nutritional Supplements (ENSURE COMPLETE) LIQD Take 1 Bottle by mouth 2 times daily Yes Francisca Guerrero MD   Incontinence Supply Disposable (INCONTINENCE BRIEF MEDIUM) MISC four times daily as needed incontinence Yes Francisca Guerrero MD   albuterol sulfate  (90 Base) MCG/ACT inhaler Inhale 2 puffs into the lungs 4 times daily as needed for Wheezing Yes Francisca Guerrero MD   [address] Yes Francisca Guerrero MD   Respiratory Therapy Supplies (ONE FLOW SPIROMETER) EDNA Use as needed Yes Francisca Guerrero MD   Spacer/Aero-Holding Chambers EDNA 1 Device by Does not apply route daily Yes Skylar Patiño, APRN - CNP   Misc. Devices (MATTRESS PAD) MISC [employer]; Egg Crate mattress Yes Hay Canela MD   Misc. Devices ([address] [place] [address]) [facility] openning to fit over toilet Yes Francisca Guerrero MD   Misc. Devices (TRANSPORT CHAIR) MISC Lightweight, collapsible Yes Francisca Guerrero MD   Omega-3 Fatty Acids (FISH OIL) 1000 MG CAPS Take 3 capsules by mouth daily Yes Francisca Guerrero MD   Calcium Carb-Cholecalciferol (CALCIUM 500/D) 500-400 MG-UNIT CHEW Take 1 tablet by mouth daily. Yes Francisca Guerrero MD   Multiple Minerals-Vitamins (MIKE-MAG-ZINC-D) TABS Take 1 tablet by mouth.  Yes Francisca Guerrero MD cetirizine (ZYRTEC) 10 MG tablet Take 10 mg by mouth daily. Yes Historical Provider, MD   aspirin 325 MG tablet Take 325 mg by mouth daily. Yes Historical Provider, MD   dorzolamide-timolol (COSOPT) 22.3-6.8 MG/ML ophthalmic solution 1 drop 2 times daily.  Dr. Trell JERRY Yes Historical Provider, MD      Family History   Problem Relation Age of Onset    Breast Cancer Daughter      Social History     Tobacco Use    Smoking status: Never Smoker    Smokeless tobacco: Never Used    Tobacco comment: congrats   Substance Use Topics    Alcohol use: No    Drug use: No      Immunization History   Administered Date(s) Administered    COVID-19, Moderna, PF, 100mcg/0.5mL 02/01/2021, 02/22/2021    Pneumococcal Conjugate 7-valent (Prevnar7) 06/23/2009    Pneumococcal Polysaccharide (Dadcarfae88) 06/23/2009    Tdap (Boostrix, Adacel) 08/13/2008     LAST LABS  Cholesterol, Total   Date Value Ref Range Status   02/26/2019 211 (H) 0 - 199 mg/dL Final     LDL Calculated   Date Value Ref Range Status   02/26/2019 123 (H) <100 mg/dL Final     HDL   Date Value Ref Range Status   02/26/2019 63 (H) 40 - 60 mg/dL Final   05/03/2012 49 40 - 60 mg/dl Final     Triglycerides   Date Value Ref Range Status   02/26/2019 123 0 - 150 mg/dL Final     Lab Results   Component Value Date    GLUCOSE 79 12/14/2020     Lab Results   Component Value Date     12/14/2020    K 4.1 12/14/2020    CREATININE 1.2 12/14/2020     Lab Results   Component Value Date    WBC 5.6 12/14/2020    HGB 11.9 (L) 12/14/2020    HCT 35.8 (L) 12/14/2020    MCV 94.9 12/14/2020     12/14/2020     Lab Results   Component Value Date    ALT 14 07/28/2020    AST 20 07/28/2020    ALKPHOS 58 07/28/2020    BILITOT 0.3 07/28/2020     TSH (uIU/mL)   Date Value   02/26/2019 1.30     Lab Results   Component Value Date    LABA1C 5.6 02/26/2019      CareTeam (Including outside providers/suppliers regularly involved in providing care):   Patient Care Team:  Tuan Mills Sonny Lopez MD as PCP - Kate Santana MD as PCP - [facility] Empaneled Provider  Maria Victoria Grigsby MD as Consulting Physician (Ophthalmology)    The following problems were reviewed today and where indicated follow up appointments were made and/or referrals ordered. Positive Risk Factor Screenings with Interventions:     Fall Risk:  2 or more falls in past year?: (!) yes  Fall with injury in past year?: no  Fall Risk Interventions:    · Patient declines any further evaluation/treatment for this issue  · has WC, walker, last fall over 6 mo ago    [employer] and ACP:  General  In general, how would you say your health is?: Good  In the past 7 days, have you experienced any of the following?  New or Increased Pain, New or Increased Fatigue, Loneliness, Social Isolation, Stress or Anger?: None of These  Do you get the social and emotional support that you need?: Yes  Do you have a Living Will?: Yes  Advance Directives     Power of [address] [place] [address] Will ACP-Advance Directive ACP-Power of     Not on File Not on File Not on File Not on File      General Health Risk Interventions:  · Pain issues: patient declines any further evaluation/treatment for this issue, manageable      Health Habits/Nutrition:  Health Habits/Nutrition  Do you exercise for at least 20 minutes 2-3 times per week?: (!) No  Have you lost any weight without trying in the past 3 months?: No  Do you eat only one meal per day?: No  Have you seen the dentist within the past year?: Yes     Health Habits/Nutrition Interventions:  · Inadequate physical activity:  mostly in chair but up at least 3x a day      Hearing/Vision:  No exam data present  Hearing/Vision  Do you or your family notice any trouble with your hearing that hasn't been managed with hearing aids?: (!) Yes  Do you have difficulty driving, watching TV, or doing any of your daily activities because of your eyesight?: (!) Yes  Have you had an eye exam within the past year?: Yes Hearing/Vision Interventions:  · Vision concerns:  sees optho today. Has glaucoma. Declining surgery since no gaurantees. 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    ADL:  ADLs  In the past 7 days, did you need help from others to perform any of the following everyday activities? Eating, dressing, grooming, bathing, toileting, or walking/balance?: (!) Bathing  In the past 7 days, did you need help from others to take care of any of the following? Laundry, housekeeping, banking/finances, shopping, telephone use, food preparation, transportation, or taking medications?: [employer], Housekeeping, [employer], Shopping, Food Preparation, Transportation, Taking Medications  ADL Interventions:  · 2 dodie are managing OK at this time    Current Health Maintenance Status  Recommendations for [employer] Due: see orders. Recommended screening schedule for the next 5-10 years is provided to the patient in written form: see Patient Instructions/AVS.    PHYSICAL EXAM:  VITALS:  /68 (Site: Right Upper Arm, Position: Sitting, Cuff Size: Medium Adult)   Pulse 83   Resp 16   Ht 5' 1\" (1.549 m)   SpO2 98%   BMI 27.21 kg/m²   BP Readings from Last 5 Encounters:   06/16/21 110/68   12/14/20 108/74   07/28/20 126/70   06/17/19 128/60   02/26/19 118/78     Wt Readings from Last 5 Encounters:   12/14/20 144 lb (65.3 kg)   07/28/20 136 lb 3.2 oz (61.8 kg)   06/17/19 126 lb (57.2 kg)   02/26/19 129 lb (58.5 kg)   11/20/18 133 lb (60.3 kg)   Body mass index is 27.21 kg/m². GENERAL: well-developed, well-nourished, alert, no distress, calm   EYES: negative findings: lids and lashes normal and conjunctivae and sclerae normal  ENT: normal TM's and external ear canals both ears  · External nose and ears appear normal  · Pharynx: normal. Exudates: None  · Lips, mucosa, and tongue normal  · Hearing grossly decreased.     NECK: No adenopathy, supple, symmetrical, trachea midline  · Thyroid not enlarged, symmetric, no tenderness/mass/nodules  · no not, please call office. · Please get flu vaccine when available in fall. Can get either at this office or at stores such as [employer].

## 2021-06-16 NOTE — PATIENT INSTRUCTIONS
FYI: While Medicare provides you with a FREE ANNUAL PREVENTIVE PHYSICAL, this visit does NOT include management of chronic medical problems or physical examination. Dr. Rio Nguyen usually does a combination visit if you have other medical problems so you don't have to come back for another visit. However, this means that there will be a co-pay. INSTRUCTIONS  NEXT APPOINTMENT: Please schedule check-up in 7 months. FASTING  · PLEASE TAKE THIS FORM TO CHECK-OUT WINDOW TO SCHEDULE NEXT VISIT. · PLEASE GET BLOODWORK DRAWN TODAY ON FIRST FLOOR in 170. Take orders with you. RESULTS- most blood tests back in couple days. We will call you if any problems. If bloodwork good, you will get letter in mail or notified thru [address] (if signed up) within 2 weeks. If you do not, please call office. · Please get flu vaccine when available in fall. Can get either at this office or at stores such as [employer]. Patient Education         Personalized Preventive Plan for Jaime Duncan - 6/16/2021  Medicare offers a range of preventive health benefits. Some of the tests and screenings are paid in full while other may be subject to a deductible, co-insurance, and/or copay. Some of these benefits include a comprehensive review of your medical history including lifestyle, illnesses that may run in your family, and various assessments and screenings as appropriate. After reviewing your medical record and screening and assessments performed today your provider may have ordered immunizations, labs, imaging, and/or referrals for you. A list of these orders (if applicable) as well as your Preventive Care list are included within your After Visit Summary for your review. Other Preventive Recommendations:    · A preventive eye exam performed by an eye specialist is recommended every 1-2 years to screen for glaucoma; cataracts, macular degeneration, and other eye disorders.   · A preventive dental visit is recommended every 6 months. · Try to get at least 150 minutes of exercise per week or 10,000 steps per day on a pedometer . · Order or download the FREE \"Exercise & Physical Activity: Your Everyday Guide\" from [employer]. Call 8-717.405.7909 or search [employer] online. · You need 4319-3673 mg of calcium and 7706-9587 IU of vitamin D per day. It is possible to meet your calcium requirement with diet alone, but a vitamin D supplement is usually necessary to meet this goal.  · When exposed to the sun, use a sunscreen that protects against both UVA and UVB radiation with an SPF of 30 or greater. Reapply every 2 to 3 hours or after sweating, drying off with a towel, or swimming. · Always wear a seat belt when traveling in a car. Always wear a helmet when riding a bicycle or motorcycle.

## 2021-06-17 LAB
ESTIMATED AVERAGE GLUCOSE: 122.6 MG/DL
HBA1C MFR BLD: 5.9 %

## 2021-06-26 DIAGNOSIS — I10 ESSENTIAL HYPERTENSION: ICD-10-CM

## 2021-06-28 RX ORDER — BENAZEPRIL HYDROCHLORIDE 20 MG/1
TABLET ORAL
Qty: 90 TABLET | Refills: 1 | Status: SHIPPED | OUTPATIENT
Start: 2021-06-28 | End: 2022-01-03

## 2021-06-30 RX ORDER — MEMANTINE HYDROCHLORIDE 10 MG/1
TABLET ORAL
Qty: 180 TABLET | Refills: 1 | Status: SHIPPED | OUTPATIENT
Start: 2021-06-30 | End: 2022-05-09

## 2021-07-01 ENCOUNTER — PREP FOR PROCEDURE (OUTPATIENT)
Dept: OPHTHALMOLOGY | Age: 86
End: 2021-07-01

## 2021-07-01 RX ORDER — BRIMONIDINE TARTRATE 2 MG/ML
1 SOLUTION/ DROPS OPHTHALMIC ONCE
Status: CANCELLED | OUTPATIENT
Start: 2021-07-01 | End: 2021-07-01

## 2021-07-01 RX ORDER — BALANCED SALT SOLUTION ENRICHED WITH BICARBONATE, DEXTROSE, AND GLUTATHIONE
KIT INTRAOCULAR ONCE
Status: CANCELLED | OUTPATIENT
Start: 2021-07-01 | End: 2021-07-01

## 2021-07-01 RX ORDER — PILOCARPINE HYDROCHLORIDE 10 MG/ML
1 SOLUTION/ DROPS OPHTHALMIC
Status: CANCELLED | OUTPATIENT
Start: 2021-07-01 | End: 2021-07-01

## 2021-07-01 RX ORDER — PROPARACAINE HYDROCHLORIDE 5 MG/ML
1 SOLUTION/ DROPS OPHTHALMIC ONCE
Status: CANCELLED | OUTPATIENT
Start: 2021-07-01 | End: 2021-07-01

## 2021-07-01 NOTE — PROGRESS NOTES
[address]  [phone]        Pre-Op Phone Call:     Patient Name: Joann Allison     Telephone Information:   Mobile [phone]   Mobile [phone]     Home phone:  [phone]    Surgery Time:   0820     Arrival Time:  0720     Left extended Message:  NA     Message left with: spoke to daughter [facility]      Recent change in health status:  No     Advised of transportation/ policy:  Yes     NPO policy reviewed:  NA     Advised to take morning heart/blood pressure medications with sips of water morning of surgery? Yes     Instructed to bring eye drops, photo identification, and insurance card day of surgery? Yes     Advised to wear short sleeved button down shirt (no T-shirt underneath):  NA     Advised not to wear jewelry, hairpins, or pantyhose day of surgery? NA     Advised not to wear make-up and to wash face day of surgery?   NA    Remarks:        Electronically signed by:  Saige Bruno RN at 7/1/2021 12:31 PM

## 2021-07-02 ENCOUNTER — HOSPITAL ENCOUNTER (OUTPATIENT)
Dept: SURGERY | Age: 86
Discharge: HOME | End: 2021-07-02
Payer: COMMERCIAL

## 2021-07-02 VITALS — SYSTOLIC BLOOD PRESSURE: 153 MMHG | DIASTOLIC BLOOD PRESSURE: 85 MMHG

## 2021-07-02 PROCEDURE — 66761 REVISION OF IRIS: CPT

## 2021-07-02 PROCEDURE — 2500000003 HC RX 250 WO HCPCS: Performed by: OPHTHALMOLOGY

## 2021-07-02 PROCEDURE — 6370000000 HC RX 637 (ALT 250 FOR IP): Performed by: OPHTHALMOLOGY

## 2021-07-02 RX ORDER — PROPARACAINE HYDROCHLORIDE 5 MG/ML
1 SOLUTION/ DROPS OPHTHALMIC ONCE
Status: COMPLETED | OUTPATIENT
Start: 2021-07-02 | End: 2021-07-02

## 2021-07-02 RX ORDER — PILOCARPINE HYDROCHLORIDE 10 MG/ML
1 SOLUTION/ DROPS OPHTHALMIC
Status: COMPLETED | OUTPATIENT
Start: 2021-07-02 | End: 2021-07-02

## 2021-07-02 RX ORDER — BALANCED SALT SOLUTION ENRICHED WITH BICARBONATE, DEXTROSE, AND GLUTATHIONE
KIT INTRAOCULAR ONCE
Status: COMPLETED | OUTPATIENT
Start: 2021-07-02 | End: 2021-07-02

## 2021-07-02 RX ORDER — BRIMONIDINE TARTRATE 2 MG/ML
1 SOLUTION/ DROPS OPHTHALMIC ONCE
Status: COMPLETED | OUTPATIENT
Start: 2021-07-02 | End: 2021-07-02

## 2021-07-02 RX ADMIN — BRIMONIDINE TARTRATE 1 DROP: 2 SOLUTION OPHTHALMIC at 08:50

## 2021-07-02 RX ADMIN — PROPARACAINE HYDROCHLORIDE 1 DROP: 5 SOLUTION/ DROPS OPHTHALMIC at 07:35

## 2021-07-02 RX ADMIN — PILOCARPINE HYDROCHLORIDE 1 DROP: 10 SOLUTION/ DROPS OPHTHALMIC at 07:40

## 2021-07-02 RX ADMIN — PILOCARPINE HYDROCHLORIDE 1 DROP: 10 SOLUTION/ DROPS OPHTHALMIC at 07:35

## 2021-07-02 RX ADMIN — BALANCED SALT SOLUTION: 6.4; .75; .48; .3; 3.9; 1.7 SOLUTION OPHTHALMIC at 08:50

## 2021-07-02 RX ADMIN — PILOCARPINE HYDROCHLORIDE 1 DROP: 10 SOLUTION/ DROPS OPHTHALMIC at 07:45

## 2021-07-02 NOTE — OP NOTE
Operative Note      [facility]  [address] [facility]. [address] [facility] [address] [phone]      Name:  Galo Potts  :  1931    Age:   80 y.o. Medical Record Number:  [id number]  Date of Procedure:  2021    Preoperative diagnosis: Anatomically Narrow Angle left eye  Postoperative diagnosis: Same  Procedure: YAG Laser Iridotomy left eye  Surgeon: Kevin Figueroa MD   Anesthesia: Topical  Estimated Blood Loss: None  Specimens removed: None  Implants: None  Complications:None    Indications for procedure: The patient has a history of anatomically narrow angle which appears occludable. The patient is at risk of an acute angle closure glaucoma attack as well as chronic angle closure glaucoma changes. Laser peripheral iridotomy was recommended to address the anatomically narrow angle. Following discussion of all risks, benefits, and alternatives, the patient agreed to have the procedure done. Informed consent was signed. Operative Procedure: The patient was taken to the laser suite at [facility] where the operative site was confirmed. Topical ophthalmic anesthesia was instilled in the operative eye followed by brimonidine and pilocarpine. In the laser room 2 timeouts were performed to verify the correct procedure, eye and patient. An iridotomy lens with goniosol was placed on the operative eye. The YAG laser was used to treat the peripheral iris with 16 spots using a power of 8.5 mJ. The total power used was 68.1 mJ. The patient was not tolerating the procedure and uncooperative. Unable to complete the procedure. The patient was given the post operative drops and asked to return to clinic in 1-2 hours for an intraocular pressure check.           Electronically signed by: Kevin Figueroa MD,2021,8:39 AM

## 2021-07-02 NOTE — PROGRESS NOTES
Ambulatory to laser room. Eye marked and numbed by Dr Enoc Salazar procedure done and tolerated well by patient.   Post  instructions to pt by Dr Enoc Flores Fails setting was 8.5 mJ    # of shots was 16    Total power was 68.1 mJ

## 2021-07-06 ENCOUNTER — OFFICE VISIT (OUTPATIENT)
Dept: ENT CLINIC | Age: 86
End: 2021-07-06
Payer: COMMERCIAL

## 2021-07-06 ENCOUNTER — PROCEDURE VISIT (OUTPATIENT)
Dept: AUDIOLOGY | Age: 86
End: 2021-07-06
Payer: COMMERCIAL

## 2021-07-06 VITALS — BODY MASS INDEX: 27.21 KG/M2 | HEIGHT: 61 IN | DIASTOLIC BLOOD PRESSURE: 78 MMHG | SYSTOLIC BLOOD PRESSURE: 134 MMHG

## 2021-07-06 DIAGNOSIS — H90.3 SENSORINEURAL HEARING LOSS (SNHL) OF BOTH EARS: Primary | ICD-10-CM

## 2021-07-06 DIAGNOSIS — H93.8X3 SENSATION OF FULLNESS IN BOTH EARS: ICD-10-CM

## 2021-07-06 DIAGNOSIS — H93.13 TINNITUS OF BOTH EARS: ICD-10-CM

## 2021-07-06 PROCEDURE — 92567 TYMPANOMETRY: CPT | Performed by: AUDIOLOGIST

## 2021-07-06 PROCEDURE — G8427 DOCREV CUR MEDS BY ELIG CLIN: HCPCS | Performed by: STUDENT IN AN ORGANIZED HEALTH CARE EDUCATION/TRAINING PROGRAM

## 2021-07-06 PROCEDURE — 1123F ACP DISCUSS/DSCN MKR DOCD: CPT | Performed by: STUDENT IN AN ORGANIZED HEALTH CARE EDUCATION/TRAINING PROGRAM

## 2021-07-06 PROCEDURE — G8417 CALC BMI ABV UP PARAM F/U: HCPCS | Performed by: STUDENT IN AN ORGANIZED HEALTH CARE EDUCATION/TRAINING PROGRAM

## 2021-07-06 PROCEDURE — 99203 OFFICE O/P NEW LOW 30 MIN: CPT | Performed by: STUDENT IN AN ORGANIZED HEALTH CARE EDUCATION/TRAINING PROGRAM

## 2021-07-06 PROCEDURE — 1090F PRES/ABSN URINE INCON ASSESS: CPT | Performed by: STUDENT IN AN ORGANIZED HEALTH CARE EDUCATION/TRAINING PROGRAM

## 2021-07-06 PROCEDURE — 1036F TOBACCO NON-USER: CPT | Performed by: STUDENT IN AN ORGANIZED HEALTH CARE EDUCATION/TRAINING PROGRAM

## 2021-07-06 PROCEDURE — 92557 COMPREHENSIVE HEARING TEST: CPT | Performed by: AUDIOLOGIST

## 2021-07-06 PROCEDURE — 4040F PNEUMOC VAC/ADMIN/RCVD: CPT | Performed by: STUDENT IN AN ORGANIZED HEALTH CARE EDUCATION/TRAINING PROGRAM

## 2021-07-06 NOTE — PROGRESS NOTES
Galo Potts   1/11/1931, 80 y.o. female   <[id number]>       Referring Provider: Olivia Leija MD  Referral Type: In an order in [address] [place] [address]    Reason for Visit: Evaluation of suspected change in hearing, tinnitus, or balance. ADULT AUDIOLOGIC EVALUATION      Galo Potts is a 80 y.o. female seen today, 7/6/2021 , for an initial audiologic evaluation. Patient was seen by Yue Walton MD following today's evaluation. Patient was accompanied by her daughter. AUDIOLOGIC AND OTHER PERTINENT MEDICAL HISTORY:      Galo Potts reports a gradual decrease in hearing bilaterally. Galo Potts denied otalgia, aural fullness, otorrhea, tinnitus, dizziness, imbalance, history of falls, history of significant noise exposure, history of head trauma, history of ear surgery and family history of hearing loss. Date: 7/6/2021     IMPRESSIONS:      AD: Moderate sloping to Severe SNHL, Good WRS, Type A tymp  AS:Mild  sloping to Severe SNHL, Good WRS, Type A tymp    Hearing loss consistent with Sensorineural hearing loss. Hearing loss significant enough to create hearing difficulty in most listening situations. Discussed hearing loss and hearing aids with patient. Patient has a medicaid insurance plan. We are not in network with medicaid for hearing aid services. I provided the patient with contact information to the [facility] and [facility] to pursue amplification. Patient to follow medical recommendations per Yue Walton MD .    ASSESSMENT AND FINDINGS:     Otoscopy revealed: Clear ear canals bilaterally    RIGHT EAR:  Hearing Sensitivity: Moderate to Severe Sensorineural hearing loss  Speech Recognition Threshold: 55 dB HL  Word Recognition: Good (80-89%), based on NU-6  25-word list at 85m dBHL using recorded speech stimuli. Tympanometry: Normal peak pressure and compliance, Type A tympanogram, consistent with normal middle ear function.   Acoustic Reflexes: Ipsilateral: Did not test. Contralateral: Did not test.    LEFT EAR:  Hearing Sensitivity: Mild to Severe Sensorineural hearing loss. Speech Recognition Threshold: 55 dB HL  Word Recognition: Good (80-89%), based on NU-6 25-word list at 85m dBHL using recorded speech stimuli. Tympanometry: Normal peak pressure and compliance, Type A tympanogram, consistent with normal middle ear function. Acoustic Reflexes: Ipsilateral: Did not test. Contralateral: Did not test.    Reliability: Good  Transducer: Inserts    See scanned audiogram dated 7/6/2021  for results. PATIENT EDUCATION:       The following items were discussed with the patient:    - Good Communication Strategies and  - Hearing Loss and Hearing Aids    Educational information was shared in the After Visit Summary. RECOMMENDATIONS:                                                                                                                                                                                                                                                            The following items are recommended based on patient report and results from today's appointment:   - Continue medical follow-up with Carolyne España MD.   - Retest hearing as medically indicated and/or sooner if a change in hearing is noted. - If desired, schedule a Hearing Aid Evaluation (HAE) appointment to discuss hearing aid options. and  - Utilize \"Good Communication Strategies\" as discussed to assist in speech understanding with communication partners.        Lauren Ferris  Audiologist    Chart CC'd to: Urbano Chicas MD      Degree of   Hearing Sensitivity dB Range   Within Normal Limits (WNL) 0 - 20   Mild 20 - 40   Moderate 40 - 55   Moderately-Severe 55 - 70   Severe 70 - 90   Profound 90 +

## 2021-07-06 NOTE — PATIENT INSTRUCTIONS
Medicaid Hearing Aid Providers [facility], [facility]    These offices are known to work with some Medicaid plans for hearing aids. They may not work with ALL Medicaid plans. You should contact your insurance provider to find an audiologist for hearing aids near you. Main Office  [address] [place] [address] [place]    Voice: [phone]  Video Phone: [phone]  Fax [phone]  Hours:  8:30-5:00  Monday through Friday [place] Office  [address] [facility],[address] [place] [address], [facility]    Voice [phone]  Fax [phone]    Hours: 8:30-noon and 1:00-5:00  Monday through Friday   [address] [facility]. [address]    Voice [phone]  Fax [phone]    Hours:  8:30-noon and 1:00-5:00  Monday through Friday         [address]  [address] [place] [address]  Phone: [phone]  Hours:  8:00-5:00  Monday through Friday [place] [address] [place], [place] [address] Physicians Office [facility]  [address] [place] [address] [address]  [facility], [address]  Phone: [phone]  Hours:  8:00-5:00  Monday through Friday     Medicaid Hearing Aid Providers [facility]    These offices are known to work with some Medicaid plans for hearing aids. They may not work with ALL Medicaid plans. You should contact your insurance provider to find an audiologist for hearing aids near you. Main Office  [address] [place] [address] [place]    Voice: [phone]  Video Phone: [phone]  Fax [phone]  Hours:  8:30-5:00  Monday through Friday [place] Office  [address] [facility],[address]   [address] [place] [address], [address]    Voice [phone]  Fax [phone]    Hours: 8:30-noon and 1:00-5:00  Monday through Friday   [address] [facility]. [address]    Voice [phone]  Fax [phone]    Hours: 8:30-noon and 1:00-5:00  Monday through Friday         [address]  [address], [address] [facility]  Phone: [phone]  Hours:  8:00-5:00  Monday through Friday [address] Physicians Office [facility])  [address] [place] [address] [facility] [facility] [address]  [place], [address]   Phone: [phone]  Hours:  8:00-5:00  Monday through Friday       Good Communication Strategies    Communication can be challenging for anyone, but can be especially difficult for those with some degree of hearing loss. While we may not be able to control every factor that may lead to difficulty with communication, there are Good Communication Strategies that we can all use in our day-to-day lives. Communication takes both parties working together for it to be successful. Tips as a Listener:   1. Control your environment. It is important to limit the amount of background noise in the room when possible. You should also consider having a good light source in the room to best see the other person. 2. Ask for clarification. Instead of saying \"What?\", you can use parts of what you heard to make a new question. For example, if you heard the word \"Thursday\" but not the rest of the week, you may ask \"What was that about Thursday? \" or \"What did you want to do Thursday? \". This shows the person talking that you are listening and will help them better explain what they are saying. 3. Be an advocate for yourself. If you are hearing but not understanding, tell the other person \"I can hear you, but I need you to slow down when you speak. \"  Or if someone is facing the other direction, say \"I cannot hear you when you are not looking at me when we talk. \"       Tips as a Talker:   - Sit or stand 3 to 6 feet away to maximize audibility         -- It is unrealistic to believe someone else will fully hear your message if you are speaking from across the room or in a different room in the house   - Stay at eye level to help with visual cues   - Make sure you have the persons attention before speaking   - Use facial expressions and gestures to accentuate your message   - Raise your voice slightly (do not scream)   - Speak slowly and distinctly   - Use short, simple sentences   - Rephrase your words if the person is having a hard time understanding you    - To avoid distortion, dont speak directly into a persons ear      Some additional items that may be helpful:   - Remain patient - this is important for both parties   - Write down items that still cannot be heard/understood. You may write with pen/paper or consider typing/texting on a cell phone or smart device. - If background noise is unavoidable, try to keep yourself in a good position in the room. By sitting at a mcmillan on the side of the restaurant (preferably a corner), it will be easier to communicate than if you were sitting at a table in the middle with background noise surrounding you. Keep yourself positioned away from music speakers or heavy foot traffic. Hearing Loss: Care Instructions  Your Care Instructions      Hearing loss is a sudden or slow decrease in how well you hear. It can range from mild to profound. Permanent hearing loss can occur with aging, and it can happen when you are exposed long-term to loud noise. Examples include listening to loud music, riding motorcycles, or being around other loud machines. Hearing loss can affect your work and home life. It can make you feel lonely or depressed. You may feel that you have lost your independence. But hearing aids and other devices can help you hear better and feel connected to others. Follow-up care is a key part of your treatment and safety. Be sure to make and go to all appointments, and call your doctor if you are having problems. It's also a good idea to know your test results and keep a list of the medicines you take.     How can you care for yourself at home? · Avoid loud noises whenever possible. This helps keep your hearing from getting worse. Always wear hearing protection around loud noises. · If appropriate, wear hearing aid(s) as directed. It is recommended that hearing aids are worn during all waking hours to keep your brain active and give it access to the sounds it is missing. · If you are beginning your process with hearing aid(s), schedule a \"Hearing Aid Evaluation\" with an audiologist to discuss your lifestyle, features of hearing aid technology, and styles of hearing aids available. It is recommended that you contact your insurance company to determine if you have a hearing aid benefit, as this may dictate who you can see for these services. · Have hearing tests as your doctor suggests. They can show whether your hearing has changed. Your hearing aid may need to be adjusted. · Use other assistive devices as needed. These may include:  ? Telephone amplifiers and hearing aids that can connect to a television, stereo, radio, or microphone. ? Devices that use lights or vibrations. These alert you to the doorbell, a ringing telephone, or a baby monitor. ? Television closed-captioning. This shows the words at the bottom of the screen. Most new TVs can do this. ? TTY (text telephone). This lets you type messages back and forth on the telephone instead of talking or listening. These devices are also called TDD. When messages are typed on the keyboard, they are sent over the phone line to a receiving TTY. The message is shown on a monitor. · Use pagers, fax machines, text, and email if it is hard for you to communicate by telephone. · Try to learn a listening technique called speech-reading. It is not lip-reading. You pay attention to people's gestures, expressions, posture, and tone of voice. These clues can help you understand what a person is saying. Face the person you are talking to, and have him or her face you.  Make sure Their small size may be hard for some people to handle. They are not made for children. You have some options if you have a hearing problem and are thinking about getting hearing aids. You can go to your doctor or an audiologist. He or she will do a hearing test and help you decide which type and style of hearing aid may be best for you. What else should I know about hearing aids? Find out if your insurance covers hearing aids. They can be expensive. Different types of hearing aids come with different costs. Also find out about a warranty or return policy in case you are not happy with your hearing aids. Follow-up care is a key part of your treatment and safety. Be sure to make and go to all appointments, and call your doctor if you are having problems. It's also a good idea to know your test results and keep a list of the medicines you take. Where can you learn more? Go to [email or web address]. org and sign in to your Smart Lunches account. Enter Z228 in the Referrizer box to learn more about \"Learning About Hearing Aids. \"     If you do not have an account, please click on the \"Sign Up Now\" link. Current as of: October 21, 2018  Content Version: 12.1  © 8720-3890 Healthwise, Incorporated. Care instructions adapted under license by [facility]). If you have questions about a medical condition or this instruction, always ask your healthcare professional. [place] [address] any warranty or liability for your use of this information.

## 2021-07-06 NOTE — PROGRESS NOTES
[address] [facility] En[facility] (:  1931) is a 80 y.o. female, here for evaluation of the following chief complaint(s):  Hearing Problem      ASSESSMENT/PLAN:  1. Sensorineural hearing loss (SNHL) of both ears  2. Tinnitus of both ears  3. Sensation of fullness in both ears      This is a very pleasant 80 y.o. female here today for evaluation of the the above-noted complaints. I reviewed the patient's audiogram with her today and explained to her the results of the test.  She has evidence of moderate sloping to severe hearing loss bilaterally with type a tympanograms and good word recognition scores. I do think she would be an excellent candidate for hearing aids and feel that this would help her overall quality of life and ability to interact with others, particularly when there is background noise or in crowded settings. The patient is cleared for hearing aids should she wish to pursue them and information was provided to her regarding places where she could obtain these. SUBJECTIVE/OBJECTIVE:  NELSON Mcpherson is here today for evaluation of issues related to her hearing. The patient is accompanied by her daughter who provides a lot of the history. Over the last several years her hearing has been declining. She now has difficulty talking on speaker phone with her daughter if there is any background noise. She gets intermittent ringing in ears and get some occasional ear fullness. The following documents were reviewed in the electronic medical record and pertinent findings are listed below:    Radha Mcpherson is a 80 y.o. female seen today, 2021 , for an initial audiologic evaluation. Patient was seen by Ruby Anderson MD following today's evaluation.  Patient was accompanied by her daughter.      AUDIOLOGIC AND OTHER PERTINENT MEDICAL HISTORY:       Radha Mcpherson reports a gradual decrease in and despite editing, the text may contain inaccuracies due to incorrect word recognition. If further clarification is needed please contact the office at [phone]. An electronic signature was used to authenticate this note.     --Genesis Evangelista MD

## 2021-07-06 NOTE — Clinical Note
Dr. Wing Samuel,    Thank you for your referral for audiometric testing on this patient. Please see the scanned audiogram (under \"Media\" tab) and encounter note for details. Today's testing revealed:  AD: Moderate sloping to Severe SNHL, Good WRS, Type A tymp  AS:Mild  sloping to Severe SNHL, Good WRS, Type A tymp    Hearing loss consistent with Sensorineural hearing loss. If you have any questions, or if there is anything else you need, please let me know.       Lauren aGllagher  Audiologist  ---  [facility] - Audiology

## 2021-07-28 DIAGNOSIS — E03.9 ACQUIRED HYPOTHYROIDISM: ICD-10-CM

## 2021-07-28 DIAGNOSIS — F03.90 DEMENTIA WITHOUT BEHAVIORAL DISTURBANCE, UNSPECIFIED DEMENTIA TYPE: ICD-10-CM

## 2021-07-28 RX ORDER — DONEPEZIL HYDROCHLORIDE 10 MG/1
TABLET, FILM COATED ORAL
Qty: 90 TABLET | Refills: 1 | Status: SHIPPED | OUTPATIENT
Start: 2021-07-28 | End: 2022-01-10

## 2021-07-28 RX ORDER — ALBUTEROL SULFATE 90 UG/1
2 AEROSOL, METERED RESPIRATORY (INHALATION) 4 TIMES DAILY PRN
Qty: 8.5 INHALER | Refills: 0 | Status: SHIPPED | OUTPATIENT
Start: 2021-07-28 | End: 2021-09-20

## 2021-07-28 RX ORDER — LEVOTHYROXINE SODIUM 0.07 MG/1
TABLET ORAL
Qty: 90 TABLET | Refills: 0 | Status: SHIPPED | OUTPATIENT
Start: 2021-07-28 | End: 2021-09-20

## 2021-08-16 RX ORDER — ALBUTEROL SULFATE 90 UG/1
2 AEROSOL, METERED RESPIRATORY (INHALATION) 4 TIMES DAILY PRN
Qty: 8.5 INHALER | Refills: 0 | OUTPATIENT
Start: 2021-08-16

## 2021-09-18 DIAGNOSIS — E03.9 ACQUIRED HYPOTHYROIDISM: ICD-10-CM

## 2021-09-20 RX ORDER — LEVOTHYROXINE SODIUM 0.07 MG/1
TABLET ORAL
Qty: 90 TABLET | Refills: 1 | Status: SHIPPED | OUTPATIENT
Start: 2021-09-20 | End: 2022-03-27

## 2021-09-20 RX ORDER — ALBUTEROL SULFATE 90 UG/1
2 AEROSOL, METERED RESPIRATORY (INHALATION) 4 TIMES DAILY PRN
Qty: 8.5 EACH | Refills: 2 | Status: SHIPPED | OUTPATIENT
Start: 2021-09-20 | End: 2021-12-06

## 2021-11-24 DIAGNOSIS — I69.351 HEMIPLEGIA OF RIGHT DOMINANT SIDE AS LATE EFFECT OF CEREBRAL INFARCTION, UNSPECIFIED HEMIPLEGIA TYPE (HCC): ICD-10-CM

## 2021-11-24 DIAGNOSIS — R53.1 WEAKNESS: ICD-10-CM

## 2021-11-24 DIAGNOSIS — F03.90 DEMENTIA WITHOUT BEHAVIORAL DISTURBANCE, UNSPECIFIED DEMENTIA TYPE: ICD-10-CM

## 2021-11-24 DIAGNOSIS — M47.816 SPONDYLOSIS OF LUMBAR REGION WITHOUT MYELOPATHY OR RADICULOPATHY: ICD-10-CM

## 2021-11-24 NOTE — TELEPHONE ENCOUNTER
DOP called in requesting these items for her Mom. She said she just ordered some for November but wanted to make sure we ordered these for December as the paperwork will  . She wanted to get a head start with the paperwork for these items for next year.     She will need a LARGE in the Incontinence Briefs NOT A MEDIUM    Please Advise

## 2021-11-26 RX ORDER — UNDERPADS 23" X 36"
EACH MISCELLANEOUS
Qty: 100 EACH | Refills: 5 | OUTPATIENT
Start: 2021-11-26

## 2021-12-03 ENCOUNTER — TELEPHONE (OUTPATIENT)
Dept: FAMILY MEDICINE CLINIC | Age: 86
End: 2021-12-03

## 2021-12-03 NOTE — TELEPHONE ENCOUNTER
DOP calling stating that pt was denied hearing aides from [facility]. Stated that they would like to know if Dr. Angela Chambers could do an appeal. Stated that the provider that did the test was not on the list of providers [facility] has. Stated [facility] sent a copy of denial to pt that states they were not sent enough information. Stated she would like to know if Dr. Angela Chambers could appeal and see if pt can get hearing aides. Stated that [facility] told her that they sent a copy of denial to her provider. Stated that the deadline is 1.1.2022 for appeal. Stated that the report could have been sent to the provider that did the hearing test in case Dr. Angela Chambers does not have the report.     Please Advise if we received a copy and can do appeal  DOP can be reached at [phone]

## 2021-12-06 RX ORDER — ALBUTEROL SULFATE 90 UG/1
2 AEROSOL, METERED RESPIRATORY (INHALATION) 4 TIMES DAILY PRN
Qty: 8.5 EACH | Refills: 2 | Status: SHIPPED | OUTPATIENT
Start: 2021-12-06 | End: 2022-03-04 | Stop reason: SDUPTHER

## 2022-01-09 DIAGNOSIS — I10 ESSENTIAL HYPERTENSION: ICD-10-CM

## 2022-01-09 DIAGNOSIS — F03.90 DEMENTIA WITHOUT BEHAVIORAL DISTURBANCE, UNSPECIFIED DEMENTIA TYPE: ICD-10-CM

## 2022-01-10 RX ORDER — DONEPEZIL HYDROCHLORIDE 10 MG/1
TABLET, FILM COATED ORAL
Qty: 90 TABLET | Refills: 1 | Status: SHIPPED | OUTPATIENT
Start: 2022-01-10 | End: 2022-05-09 | Stop reason: SDUPTHER

## 2022-01-10 RX ORDER — FUROSEMIDE 40 MG/1
TABLET ORAL
Qty: 90 TABLET | Refills: 0 | Status: SHIPPED | OUTPATIENT
Start: 2022-01-10 | End: 2022-03-27

## 2022-01-10 RX ORDER — BENAZEPRIL HYDROCHLORIDE 20 MG/1
TABLET ORAL
Qty: 90 TABLET | Refills: 0 | Status: SHIPPED | OUTPATIENT
Start: 2022-01-10 | End: 2022-05-09 | Stop reason: SDUPTHER

## 2022-02-21 NOTE — TELEPHONE ENCOUNTER
DOP calling stated the pt finally got approved for hearing aids through her insurance [employer]. Stated that now that pt has been approved and would like to know if Dr. Angela Chambers can write a script for the hearing aides or does pt need to call Dr. Prakash Pack. Stated that Dr. Alton Valenzuela office can be reached at [phone].     LISA can be reached at [phone]

## 2022-02-24 NOTE — TELEPHONE ENCOUNTER
Laureano Patient DOP calling stating that she did not here back from office. Advised that someone was contacted. Stated that she was not contacted.     Advised to contact Audiologist.

## 2022-03-04 RX ORDER — ALBUTEROL SULFATE 90 UG/1
2 AEROSOL, METERED RESPIRATORY (INHALATION) 4 TIMES DAILY PRN
Qty: 8.5 EACH | Refills: 2 | Status: SHIPPED | OUTPATIENT
Start: 2022-03-04 | End: 2022-06-13

## 2022-03-27 DIAGNOSIS — E03.9 ACQUIRED HYPOTHYROIDISM: ICD-10-CM

## 2022-03-27 RX ORDER — LEVOTHYROXINE SODIUM 0.07 MG/1
TABLET ORAL
Qty: 90 TABLET | Refills: 1 | Status: SHIPPED | OUTPATIENT
Start: 2022-03-27 | End: 2022-05-09 | Stop reason: SDUPTHER

## 2022-03-27 RX ORDER — FUROSEMIDE 40 MG/1
TABLET ORAL
Qty: 90 TABLET | Refills: 0 | Status: SHIPPED | OUTPATIENT
Start: 2022-03-27 | End: 2022-04-11

## 2022-04-11 RX ORDER — FUROSEMIDE 40 MG/1
TABLET ORAL
Qty: 90 TABLET | Refills: 0 | Status: SHIPPED | OUTPATIENT
Start: 2022-04-11 | End: 2022-05-09 | Stop reason: SDUPTHER

## 2022-05-09 ENCOUNTER — OFFICE VISIT (OUTPATIENT)
Dept: FAMILY MEDICINE CLINIC | Age: 87
End: 2022-05-09
Payer: COMMERCIAL

## 2022-05-09 VITALS
HEART RATE: 89 BPM | OXYGEN SATURATION: 98 % | HEIGHT: 61 IN | TEMPERATURE: 98.3 F | SYSTOLIC BLOOD PRESSURE: 118 MMHG | BODY MASS INDEX: 26.32 KG/M2 | RESPIRATION RATE: 18 BRPM | WEIGHT: 139.4 LBS | DIASTOLIC BLOOD PRESSURE: 62 MMHG

## 2022-05-09 DIAGNOSIS — I69.351 HEMIPLEGIA OF RIGHT DOMINANT SIDE AS LATE EFFECT OF CEREBRAL INFARCTION, UNSPECIFIED HEMIPLEGIA TYPE (HCC): ICD-10-CM

## 2022-05-09 DIAGNOSIS — I10 HYPERTENSION, ESSENTIAL: ICD-10-CM

## 2022-05-09 DIAGNOSIS — F03.90 DEMENTIA WITHOUT BEHAVIORAL DISTURBANCE, UNSPECIFIED DEMENTIA TYPE: ICD-10-CM

## 2022-05-09 DIAGNOSIS — E78.5 HYPERLIPIDEMIA WITH TARGET LDL LESS THAN 100: ICD-10-CM

## 2022-05-09 DIAGNOSIS — E03.9 ACQUIRED HYPOTHYROIDISM: ICD-10-CM

## 2022-05-09 DIAGNOSIS — Z86.73 HISTORY OF CVA (CEREBROVASCULAR ACCIDENT): ICD-10-CM

## 2022-05-09 DIAGNOSIS — I10 ESSENTIAL HYPERTENSION: ICD-10-CM

## 2022-05-09 DIAGNOSIS — I10 HYPERTENSION, ESSENTIAL: Primary | ICD-10-CM

## 2022-05-09 LAB
BASOPHILS ABSOLUTE: 0.1 K/UL (ref 0–0.2)
BASOPHILS RELATIVE PERCENT: 1.2 %
EOSINOPHILS ABSOLUTE: 0.2 K/UL (ref 0–0.6)
EOSINOPHILS RELATIVE PERCENT: 3.6 %
HCT VFR BLD CALC: 40.3 % (ref 36–48)
HEMOGLOBIN: 13.5 G/DL (ref 12–16)
LYMPHOCYTES ABSOLUTE: 1.6 K/UL (ref 1–5.1)
LYMPHOCYTES RELATIVE PERCENT: 27.4 %
MCH RBC QN AUTO: 31.5 PG (ref 26–34)
MCHC RBC AUTO-ENTMCNC: 33.5 G/DL (ref 31–36)
MCV RBC AUTO: 94 FL (ref 80–100)
MONOCYTES ABSOLUTE: 0.5 K/UL (ref 0–1.3)
MONOCYTES RELATIVE PERCENT: 9.4 %
NEUTROPHILS ABSOLUTE: 3.3 K/UL (ref 1.7–7.7)
NEUTROPHILS RELATIVE PERCENT: 58.4 %
PDW BLD-RTO: 15 % (ref 12.4–15.4)
PLATELET # BLD: 313 K/UL (ref 135–450)
PMV BLD AUTO: 9.4 FL (ref 5–10.5)
RBC # BLD: 4.29 M/UL (ref 4–5.2)
WBC # BLD: 5.7 K/UL (ref 4–11)

## 2022-05-09 PROCEDURE — 1036F TOBACCO NON-USER: CPT | Performed by: FAMILY MEDICINE

## 2022-05-09 PROCEDURE — G8427 DOCREV CUR MEDS BY ELIG CLIN: HCPCS | Performed by: FAMILY MEDICINE

## 2022-05-09 PROCEDURE — G8417 CALC BMI ABV UP PARAM F/U: HCPCS | Performed by: FAMILY MEDICINE

## 2022-05-09 PROCEDURE — 99214 OFFICE O/P EST MOD 30 MIN: CPT | Performed by: FAMILY MEDICINE

## 2022-05-09 PROCEDURE — 4040F PNEUMOC VAC/ADMIN/RCVD: CPT | Performed by: FAMILY MEDICINE

## 2022-05-09 PROCEDURE — 1123F ACP DISCUSS/DSCN MKR DOCD: CPT | Performed by: FAMILY MEDICINE

## 2022-05-09 PROCEDURE — 1090F PRES/ABSN URINE INCON ASSESS: CPT | Performed by: FAMILY MEDICINE

## 2022-05-09 RX ORDER — LEVOTHYROXINE SODIUM 0.07 MG/1
TABLET ORAL
Qty: 90 TABLET | Refills: 3 | Status: SHIPPED | OUTPATIENT
Start: 2022-05-09

## 2022-05-09 RX ORDER — BENAZEPRIL HYDROCHLORIDE 20 MG/1
TABLET ORAL
Qty: 90 TABLET | Refills: 1 | Status: SHIPPED | OUTPATIENT
Start: 2022-05-09

## 2022-05-09 RX ORDER — FUROSEMIDE 40 MG/1
20 TABLET ORAL DAILY
Qty: 90 TABLET | Refills: 1 | Status: SHIPPED | OUTPATIENT
Start: 2022-05-09

## 2022-05-09 RX ORDER — DONEPEZIL HYDROCHLORIDE 10 MG/1
TABLET, FILM COATED ORAL
Qty: 90 TABLET | Refills: 3 | Status: SHIPPED | OUTPATIENT
Start: 2022-05-09

## 2022-05-09 ASSESSMENT — PATIENT HEALTH QUESTIONNAIRE - PHQ9
10. IF YOU CHECKED OFF ANY PROBLEMS, HOW DIFFICULT HAVE THESE PROBLEMS MADE IT FOR YOU TO DO YOUR WORK, TAKE CARE OF THINGS AT HOME, OR GET ALONG WITH OTHER PEOPLE: 0
SUM OF ALL RESPONSES TO PHQ QUESTIONS 1-9: 1
8. MOVING OR SPEAKING SO SLOWLY THAT OTHER PEOPLE COULD HAVE NOTICED. OR THE OPPOSITE, BEING SO FIGETY OR RESTLESS THAT YOU HAVE BEEN MOVING AROUND A LOT MORE THAN USUAL: 0
2. FEELING DOWN, DEPRESSED OR HOPELESS: 0
4. FEELING TIRED OR HAVING LITTLE ENERGY: 0
9. THOUGHTS THAT YOU WOULD BE BETTER OFF DEAD, OR OF HURTING YOURSELF: 0
6. FEELING BAD ABOUT YOURSELF - OR THAT YOU ARE A FAILURE OR HAVE LET YOURSELF OR YOUR FAMILY DOWN: 0
SUM OF ALL RESPONSES TO PHQ9 QUESTIONS 1 & 2: 0
7. TROUBLE CONCENTRATING ON THINGS, SUCH AS READING THE NEWSPAPER OR WATCHING TELEVISION: 1
5. POOR APPETITE OR OVEREATING: 0
3. TROUBLE FALLING OR STAYING ASLEEP: 0
1. LITTLE INTEREST OR PLEASURE IN DOING THINGS: 0
SUM OF ALL RESPONSES TO PHQ QUESTIONS 1-9: 1

## 2022-05-09 NOTE — PATIENT INSTRUCTIONS
INSTRUCTIONS  NEXT APPOINTMENT: Please schedule annual complete physical (30 minutes) in 6 months with Dave Powell (Nurse Practitioner). · Dr. Abby Cohen and Dave Powell (Nurse Practitioner) are sharing their practices as a team.  This will allow increased access to care. Office visits may alternate between these providers while we continue to maintain high quality of care. · PLEASE TAKE THIS FORM TO CHECK-OUT WINDOW TO SCHEDULE NEXT VISIT. · PLEASE GET BLOODWORK DRAWN TODAY ON FIRST FLOOR in 170. Take orders with you. · For allergies, patient may take OTC antihistamines such as Claritin/Allovert/loratidine 10 mg daily.

## 2022-05-09 NOTE — PROGRESS NOTES
CHRONIC CONDITION FOLOW-UP       Assessment and Plan:      Diagnosis Orders   1. Hypertension, essential  Comprehensive Metabolic Panel    CBC with Auto Differential    furosemide (LASIX) 40 MG tablet   2. Hyperlipidemia with target LDL less than 100  Comprehensive Metabolic Panel   3. History of CVA (cerebrovascular accident)     4. Hemiplegia of right dominant side as late effect of cerebral infarction, unspecified hemiplegia type ([facility] [address]     5. Dementia without behavioral disturbance, unspecified dementia type (HCC)  CBC with Auto Differential    donepezil (ARICEPT) 10 MG tablet   6. Acquired hypothyroidism  levothyroxine (SYNTHROID) 75 MCG tablet    TSH   7. Essential hypertension  benazepril (LOTENSIN) 20 MG tablet   Stable     Continue current Tx plan. Any changes marked below. INSTRUCTIONS  NEXT APPOINTMENT: Please schedule annual complete physical (30 minutes) in 6 months with Dave Powell (Nurse Practitioner). · Dr. Abby Cohen and Dave Powell (Nurse Practitioner) are sharing their practices as a team.  This will allow increased access to care. Office visits may alternate between these providers while we continue to maintain high quality of care. · PLEASE TAKE THIS FORM TO CHECK-OUT WINDOW TO SCHEDULE NEXT VISIT. · PLEASE GET BLOODWORK DRAWN TODAY ON FIRST FLOOR in 170. Take orders with you. · For allergies, patient may take OTC antihistamines such as Claritin/Allovert/loratidine 10 mg daily. Subjective:      Chief Complaint   Patient presents with    Hypertension     slight swelling in feet and legs. some wheezing.  Other     has hearing aids now. may have some wax build up.  has been pulling at [address] is an 80 y.o. female who presents for follow up    Complaints:    Sometimes Post nasal drip noted. And cough    CHART REVIEW   reports that she has never smoked.  She has never used smokeless tobacco.  Health Maintenance Due   Topic Date Due    Shingles vaccine (1 of 2) Never done    DTaP/Tdap/Td vaccine (2 - Td or Tdap) 08/13/2018    Potassium  12/14/2021    Creatinine  12/14/2021     Current Outpatient Medications   Medication Instructions    albuterol sulfate  (90 Base) MCG/ACT inhaler 2 puffs, Inhalation, 4 TIMES DAILY PRN    aspirin 325 mg, DAILY    benazepril (LOTENSIN) 20 MG tablet TAKE 1 TABLET BY MOUTH EVERY DAY    brimonidine (ALPHAGAN) 0.2 % ophthalmic solution INSTILL 1 DROP INTO LEFT EYE EVERY 12 HOURS    Calcium Carb-Cholecalciferol (CALCIUM 500/D) 500-400 MG-UNIT CHEW 1 tablet, Oral, DAILY    cetirizine (ZYRTEC) 10 mg, DAILY    CRANBERRY PO 1 caplet, Oral, DAILY    donepezil (ARICEPT) 10 MG tablet TAKE 1 TABLET BY MOUTH EVERY DAY AT NIGHT    dorzolamide-timolol (COSOPT) 22.3-6.8 MG/ML ophthalmic solution 1 drop, 2 TIMES DAILY    fish oil 3,000 mg, Oral, DAILY    furosemide (LASIX) 20 mg, Oral, DAILY    Incontinence Supply Disposable MISC LARGE.  Four times daily as needed incontinence    levothyroxine (SYNTHROID) 75 MCG tablet TAKE 1 TABLET BY MOUTH EVERY DAY    Multiple Minerals-Vitamins (MIKE-MAG-ZINC-D) TABS 1 tablet, Oral    Respiratory Therapy Supplies (ONE FLOW SPIROMETER) EDNA Use as needed    Spacer/Aero-Holding Chambers EDNA 1 Device, Does not apply, DAILY    UNABLE TO FIND Incentive spirometer     LAST LABS  Lab Results   Component Value Date    LDLCALC 123 (H) 02/26/2019    LDLDIRECT 112 (H) 02/01/2012     Lab Results   Component Value Date    HDL 63 (H) 02/26/2019     Lab Results   Component Value Date    TRIG 123 02/26/2019     Lab Results   Component Value Date     12/14/2020    K 4.1 12/14/2020    CREATININE 1.2 12/14/2020     Lab Results   Component Value Date    WBC 5.6 12/14/2020    HGB 11.9 (L) 12/14/2020     12/14/2020     Lab Results   Component Value Date    ALT 14 07/28/2020    AST 20 07/28/2020    ALKPHOS 58 07/28/2020    BILITOT 0.3 07/28/2020     TSH (uIU/mL)   Date Value   06/16/2021 2.71     Lab Results   Component Value Date    GLUCOSE 79 12/14/2020     Lab Results   Component Value Date    LABA1C 5.9 06/16/2021    LABA1C 5.6 02/26/2019    LABA1C 5.7 02/15/2018     Objective:   PHYSICAL EXAM   /62 (Site: Right Upper Arm, Position: Sitting, Cuff Size: Medium Adult)   Pulse 89   Temp 98.3 °F (36.8 °C) (Oral)   Resp 18   Ht 5' 1\" (1.549 m)   Wt 139 lb 6.4 oz (63.2 kg)   SpO2 98%   BMI 26.34 kg/m²   BP Readings from Last 5 Encounters:   05/09/22 118/62   07/06/21 134/78   07/02/21 (!) 153/85   06/16/21 110/68   12/14/20 108/74     Wt Readings from Last 5 Encounters:   05/09/22 139 lb 6.4 oz (63.2 kg)   12/14/20 144 lb (65.3 kg)   07/28/20 136 lb 3.2 oz (61.8 kg)   06/17/19 126 lb (57.2 kg)   02/26/19 129 lb (58.5 kg)      GENERAL:   · well-developed, well-nourished, alert, no distress.    hard of hearing (aides not in today)  LUNGS:    · Breathing unlabored  · clear to auscultation bilaterally and good air movement  CARDIOVASC:   · regular rate and rhythm  SKIN: warm and dry

## 2022-05-10 LAB
A/G RATIO: 1.1 (ref 1.1–2.2)
ALBUMIN SERPL-MCNC: 4.1 G/DL (ref 3.4–5)
ALP BLD-CCNC: 73 U/L (ref 40–129)
ALT SERPL-CCNC: 15 U/L (ref 10–40)
ANION GAP SERPL CALCULATED.3IONS-SCNC: 17 MMOL/L (ref 3–16)
AST SERPL-CCNC: 20 U/L (ref 15–37)
BILIRUB SERPL-MCNC: <0.2 MG/DL (ref 0–1)
BUN BLDV-MCNC: 10 MG/DL (ref 7–20)
CALCIUM SERPL-MCNC: 9.8 MG/DL (ref 8.3–10.6)
CHLORIDE BLD-SCNC: 102 MMOL/L (ref 99–110)
CO2: 23 MMOL/L (ref 21–32)
CREAT SERPL-MCNC: 1.1 MG/DL (ref 0.6–1.2)
GFR AFRICAN AMERICAN: 56
GFR NON-AFRICAN AMERICAN: 47
GLUCOSE BLD-MCNC: 105 MG/DL (ref 70–99)
POTASSIUM SERPL-SCNC: 4.2 MMOL/L (ref 3.5–5.1)
SODIUM BLD-SCNC: 142 MMOL/L (ref 136–145)
TOTAL PROTEIN: 8 G/DL (ref 6.4–8.2)
TSH SERPL DL<=0.05 MIU/L-ACNC: 3.53 UIU/ML (ref 0.27–4.2)

## 2022-05-26 ENCOUNTER — TELEPHONE (OUTPATIENT)
Dept: FAMILY MEDICINE CLINIC | Age: 87
End: 2022-05-26

## 2022-06-13 RX ORDER — ALBUTEROL SULFATE 90 UG/1
2 AEROSOL, METERED RESPIRATORY (INHALATION) 4 TIMES DAILY PRN
Qty: 8.5 EACH | Refills: 2 | Status: SHIPPED | OUTPATIENT
Start: 2022-06-13 | End: 2022-09-12

## 2022-07-08 ENCOUNTER — TELEPHONE (OUTPATIENT)
Dept: FAMILY MEDICINE CLINIC | Age: 87
End: 2022-07-08

## 2022-07-08 NOTE — TELEPHONE ENCOUNTER
I looked through her Media and he med list to see if we ordered a Monitoring Device (for is she falls), and I don't see that we have ever ordered this for her. Is this something you would order?

## 2022-07-11 NOTE — TELEPHONE ENCOUNTER
Spoke to DOP  She said that pt already has this but Ins states she needs new order from pcp to continue  It is a machine that dispenses meds and I combined w a med alert  DOP states she was contacted by [employer] in regards to this issue   I asked her to have [employer] fax us forms to complete

## 2022-07-11 NOTE — TELEPHONE ENCOUNTER
I don't recall doing this before. Nothing readily obvious under media. Please get more info on what it is and what is needed. May need to be written on paper Rx.

## 2022-09-12 RX ORDER — ALBUTEROL SULFATE 90 UG/1
2 AEROSOL, METERED RESPIRATORY (INHALATION) 4 TIMES DAILY PRN
Qty: 8.5 EACH | Refills: 2 | Status: SHIPPED | OUTPATIENT
Start: 2022-09-12

## 2022-11-07 ENCOUNTER — OFFICE VISIT (OUTPATIENT)
Dept: FAMILY MEDICINE CLINIC | Age: 87
End: 2022-11-07
Payer: COMMERCIAL

## 2022-11-07 VITALS
HEIGHT: 61 IN | DIASTOLIC BLOOD PRESSURE: 60 MMHG | SYSTOLIC BLOOD PRESSURE: 100 MMHG | WEIGHT: 139 LBS | HEART RATE: 78 BPM | BODY MASS INDEX: 26.24 KG/M2 | OXYGEN SATURATION: 98 %

## 2022-11-07 DIAGNOSIS — I10 HYPERTENSION, ESSENTIAL: ICD-10-CM

## 2022-11-07 DIAGNOSIS — F03.90 DEMENTIA WITHOUT BEHAVIORAL DISTURBANCE (HCC): ICD-10-CM

## 2022-11-07 DIAGNOSIS — E03.9 ACQUIRED HYPOTHYROIDISM: ICD-10-CM

## 2022-11-07 DIAGNOSIS — E78.5 HYPERLIPIDEMIA WITH TARGET LDL LESS THAN 130: ICD-10-CM

## 2022-11-07 DIAGNOSIS — Z86.73 HISTORY OF CVA (CEREBROVASCULAR ACCIDENT): ICD-10-CM

## 2022-11-07 DIAGNOSIS — Z00.00 MEDICARE ANNUAL WELLNESS VISIT, SUBSEQUENT: Primary | ICD-10-CM

## 2022-11-07 DIAGNOSIS — I69.351 HEMIPLEGIA OF RIGHT DOMINANT SIDE AS LATE EFFECT OF CEREBRAL INFARCTION, UNSPECIFIED HEMIPLEGIA TYPE (HCC): ICD-10-CM

## 2022-11-07 LAB
A/G RATIO: 1.3 (ref 1.1–2.2)
ALBUMIN SERPL-MCNC: 4.2 G/DL (ref 3.4–5)
ALP BLD-CCNC: 62 U/L (ref 40–129)
ALT SERPL-CCNC: 16 U/L (ref 10–40)
ANION GAP SERPL CALCULATED.3IONS-SCNC: 14 MMOL/L (ref 3–16)
AST SERPL-CCNC: 19 U/L (ref 15–37)
BASOPHILS ABSOLUTE: 0.1 K/UL (ref 0–0.2)
BASOPHILS RELATIVE PERCENT: 1.3 %
BILIRUB SERPL-MCNC: 0.3 MG/DL (ref 0–1)
BUN BLDV-MCNC: 16 MG/DL (ref 7–20)
CALCIUM SERPL-MCNC: 10 MG/DL (ref 8.3–10.6)
CHLORIDE BLD-SCNC: 103 MMOL/L (ref 99–110)
CO2: 25 MMOL/L (ref 21–32)
CREAT SERPL-MCNC: 1.1 MG/DL (ref 0.6–1.2)
EOSINOPHILS ABSOLUTE: 0.2 K/UL (ref 0–0.6)
EOSINOPHILS RELATIVE PERCENT: 3.2 %
GFR SERPL CREATININE-BSD FRML MDRD: 47 ML/MIN/{1.73_M2}
GLUCOSE BLD-MCNC: 86 MG/DL (ref 70–99)
HCT VFR BLD CALC: 37.2 % (ref 36–48)
HEMOGLOBIN: 12.7 G/DL (ref 12–16)
LYMPHOCYTES ABSOLUTE: 1.5 K/UL (ref 1–5.1)
LYMPHOCYTES RELATIVE PERCENT: 26.2 %
MCH RBC QN AUTO: 31.7 PG (ref 26–34)
MCHC RBC AUTO-ENTMCNC: 34.1 G/DL (ref 31–36)
MCV RBC AUTO: 92.8 FL (ref 80–100)
MONOCYTES ABSOLUTE: 0.5 K/UL (ref 0–1.3)
MONOCYTES RELATIVE PERCENT: 8.8 %
NEUTROPHILS ABSOLUTE: 3.4 K/UL (ref 1.7–7.7)
NEUTROPHILS RELATIVE PERCENT: 60.5 %
PDW BLD-RTO: 14.2 % (ref 12.4–15.4)
PLATELET # BLD: 320 K/UL (ref 135–450)
PMV BLD AUTO: 9.3 FL (ref 5–10.5)
POTASSIUM SERPL-SCNC: 4.1 MMOL/L (ref 3.5–5.1)
RBC # BLD: 4.01 M/UL (ref 4–5.2)
SODIUM BLD-SCNC: 142 MMOL/L (ref 136–145)
TOTAL PROTEIN: 7.5 G/DL (ref 6.4–8.2)
WBC # BLD: 5.6 K/UL (ref 4–11)

## 2022-11-07 PROCEDURE — G8484 FLU IMMUNIZE NO ADMIN: HCPCS | Performed by: FAMILY MEDICINE

## 2022-11-07 PROCEDURE — G0439 PPPS, SUBSEQ VISIT: HCPCS | Performed by: FAMILY MEDICINE

## 2022-11-07 PROCEDURE — 1123F ACP DISCUSS/DSCN MKR DOCD: CPT | Performed by: FAMILY MEDICINE

## 2022-11-07 ASSESSMENT — LIFESTYLE VARIABLES
HOW OFTEN DO YOU HAVE A DRINK CONTAINING ALCOHOL: NEVER
HOW MANY STANDARD DRINKS CONTAINING ALCOHOL DO YOU HAVE ON A TYPICAL DAY: PATIENT DOES NOT DRINK

## 2022-11-07 ASSESSMENT — PATIENT HEALTH QUESTIONNAIRE - PHQ9
SUM OF ALL RESPONSES TO PHQ QUESTIONS 1-9: 0
SUM OF ALL RESPONSES TO PHQ9 QUESTIONS 1 & 2: 0
1. LITTLE INTEREST OR PLEASURE IN DOING THINGS: 0
SUM OF ALL RESPONSES TO PHQ QUESTIONS 1-9: 0
SUM OF ALL RESPONSES TO PHQ QUESTIONS 1-9: 0
2. FEELING DOWN, DEPRESSED OR HOPELESS: 0
SUM OF ALL RESPONSES TO PHQ QUESTIONS 1-9: 0

## 2022-11-07 NOTE — LETTER
[address] [facility]  Phone: [phone]  Fax: [phone]    Megan Houston MD         November 7, 2022     Patient: Lilly Guaman   YOB: 1931   Date of Visit: 11/7/2022       To Whom It May Concern: It is my medical opinion that Lilly Guaman requires a disability parking placard for the following reasons:  She has limited walking ability due to a neurologic condition. Duration of need: 5 years    If you have any questions or concerns, please don't hesitate to call.     Sincerely,         Megan Houston MD

## 2022-11-07 NOTE — PATIENT INSTRUCTIONS
INSTRUCTIONS  NEXT APPOINTMENT: Please schedule fasting check-up in 6 months. OK to have water, black coffee and medications. with Dr. Rodrick Kennedy or her NP, Annamarie Chung. PLEASE TAKE THIS FORM TO CHECK-OUT WINDOW TO SCHEDULE NEXT VISIT. IF you get any cuts, punctures or other open skin injury, remember that you need a tetanus booster. If  getting lightheaded when stands up. Patient Education        Personalized Preventive Plan for Radha Mani - 11/7/2022  Medicare offers a range of preventive health benefits. Some of the tests and screenings are paid in full while other may be subject to a deductible, co-insurance, and/or copay. Some of these benefits include a comprehensive review of your medical history including lifestyle, illnesses that may run in your family, and various assessments and screenings as appropriate. After reviewing your medical record and screening and assessments performed today your provider may have ordered immunizations, labs, imaging, and/or referrals for you. A list of these orders (if applicable) as well as your Preventive Care list are included within your After Visit Summary for your review. Other Preventive Recommendations:    A preventive eye exam performed by an eye specialist is recommended every 1-2 years to screen for glaucoma; cataracts, macular degeneration, and other eye disorders. A preventive dental visit is recommended every 6 months. Try to get at least 150 minutes of exercise per week or 10,000 steps per day on a pedometer . Order or download the FREE \"Exercise & Physical Activity: Your Everyday Guide\" from The [employer]. Call 4-312.863.8755 or search The [employer] online. You need 0611-3154 mg of calcium and 2794-4088 IU of vitamin D per day.  It is possible to meet your calcium requirement with diet alone, but a vitamin D supplement is usually necessary to meet this goal.  When exposed to the sun, use a

## 2022-11-07 NOTE — PROGRESS NOTES
Medicare Annual Wellness Visit  Name: Alberto Isaac  YOB: 1931  Age: 80 y.o. Sex: female  MRN: [id number]     Date of Service:  11/7/2022    Chief Complaint:   Alberto Isaac is a 80 y.o. female who presents for Medicare Annual Wellness Visit and check-up for:  1. Medicare annual wellness visit, subsequent    2. Hypertension, essential    3. Hyperlipidemia with target LDL less than 130 given age    3. History of CVA (cerebrovascular accident)    5. Hemiplegia of right dominant side as late effect of cerebral infarction, unspecified hemiplegia type ([facility] [address]    6. Acquired hypothyroidism    7. Dementia without behavioral disturbance ([facility] [address]      HPI  Chief Complaint   Patient presents with    Medicare AWV     AWV   Complaints: no issues. Here with DOP. Keeps taking hearing aides out. She does not like them. Review of Systems - unremarable except as noted above    HISTORY:  Patient's medications, allergies, past medical, and social histories were reviewed and updated as appropriate. CHART REVIEW  Health Maintenance   Topic Date Due    Shingles vaccine (1 of 2) Never done    DTaP/Tdap/Td vaccine (2 - Td or Tdap) 08/13/2018    Pneumococcal 65+ years Vaccine (1 - PCV) 07/11/2023 (Originally 6/23/2010)    Flu vaccine (1) 11/07/2023 (Originally 8/1/2022)    Depression Screen  05/09/2023    Annual Wellness Visit (AWV)  11/08/2023    COVID-19 Vaccine  Completed    Hepatitis A vaccine  Aged Out    Hib vaccine  Aged Out    Meningococcal (ACWY) vaccine  Aged Out     The ASCVD Risk score (Rafael JUNG, et al., 2019) failed to calculate for the following reasons:     The 2019 ASCVD risk score is only valid for ages 36 to 78  Current Outpatient Medications   Medication Instructions    albuterol sulfate HFA (PROVENTIL;VENTOLIN;PROAIR) 108 (90 Base) MCG/ACT inhaler 2 puffs, Inhalation, 4 TIMES DAILY PRN    aspirin 325 mg, DAILY    benazepril (LOTENSIN) 20 MG tablet TAKE 1 TABLET BY MOUTH EVERY DAY brimonidine (ALPHAGAN) 0.2 % ophthalmic solution INSTILL 1 DROP INTO LEFT EYE EVERY 12 HOURS    Calcium Carb-Cholecalciferol 500-400 MG-UNIT CHEW 1 tablet, Oral, DAILY    cetirizine (ZYRTEC) 10 mg, DAILY    CRANBERRY PO 1 caplet, Oral, DAILY    donepezil (ARICEPT) 10 MG tablet TAKE 1 TABLET BY MOUTH EVERY DAY AT NIGHT    dorzolamide-timolol (COSOPT) 22.3-6.8 MG/ML ophthalmic solution 1 drop, 2 TIMES DAILY    fish oil 3,000 mg, Oral, DAILY    furosemide (LASIX) 20 mg, Oral, DAILY    Incontinence Supply Disposable MISC LARGE.  Four times daily as needed incontinence    levothyroxine (SYNTHROID) 75 MCG tablet TAKE 1 TABLET BY MOUTH EVERY DAY    Multiple Minerals-Vitamins (MIKE-MAG-ZINC-D) TABS 1 tablet, Oral      Family History   Problem Relation Age of Onset    Breast Cancer Daughter      Social History     Tobacco Use    Smoking status: Never    Smokeless tobacco: Never    Tobacco comments:     congrats   Substance Use Topics    Alcohol use: No    Drug use: No      Immunization History   Administered Date(s) Administered    COVID-19, MODERNA BLUE border, Primary or Immunocompromised, (age 12y+), IM, 100 mcg/0.5mL 02/01/2021, 02/22/2021    COVID-19, PFIZER Bivalent BOOSTER, (age 12y+), IM, 30 mcg/0.3 mL dose 09/23/2022    COVID-19, PFIZER PURPLE top, DILUTE for use, (age 15 y+), 30mcg/0.3mL 12/22/2021    Pneumococcal Conjugate 7-valent (Prevnar7) 06/23/2009    Pneumococcal Polysaccharide ([id number]) 06/23/2009    Tdap (Boostrix, Adacel) 08/13/2008     LAST LABS  Cholesterol, Total   Date Value Ref Range Status   02/26/2019 211 (H) 0 - 199 mg/dL Final     LDL Calculated   Date Value Ref Range Status   02/26/2019 123 (H) <100 mg/dL Final     HDL   Date Value Ref Range Status   02/26/2019 63 (H) 40 - 60 mg/dL Final   05/03/2012 49 40 - 60 mg/dl Final     Triglycerides   Date Value Ref Range Status   02/26/2019 123 0 - 150 mg/dL Final     Lab Results   Component Value Date     05/09/2022    K 4.2 05/09/2022 CREATININE 1.1 05/09/2022     Lab Results   Component Value Date    WBC 5.7 05/09/2022    HGB 13.5 05/09/2022    HCT 40.3 05/09/2022    MCV 94.0 05/09/2022     05/09/2022     Lab Results   Component Value Date    ALT 15 05/09/2022    AST 20 05/09/2022    ALKPHOS 73 05/09/2022    BILITOT <0.2 05/09/2022     TSH (uIU/mL)   Date Value   05/09/2022 3.53     Lab Results   Component Value Date    GLUCOSE 105 (H) 05/09/2022     Lab Results   Component Value Date    LABA1C 5.9 06/16/2021      CareTeam (Including outside providers/suppliers regularly involved in providing care):   Patient Care Team:  Lazara Walter MD as PCP - Sg Hudson MD as PCP - [facility] Empaneled Provider  Cassandra Alba MD as Consulting Physician (Ophthalmology)    The following problems were reviewed today and where indicated follow up appointments were made and/or referrals ordered. Positive Risk Factor Screenings with Interventions:      Cognitive:   Words recalled: 0 Words Recalled  Clock Drawing Test (CDT): (!) Abnormal  Total Score Interpretation: Abnormal Mini-Cog  Did the patient refuse to take the cognition test?: No  Cognitive Impairment Interventions:  Baseline, already on Aricept       General Health and ACP:  General  In general, how would you say your health is?: Good  In the past 7 days, have you experienced any of the following: New or Increased Pain, New or Increased Fatigue, Loneliness, Social Isolation, Stress or Anger?: No  Do you get the social and emotional support that you need?: Yes  Do you have a Living Will?: Yes    Advance Directives       Power of  Living Will ACP-Advance Directive ACP-Power of     Not on File Not on File Not on File Not on File        General Health Risk Interventions:    Health Habits/Nutrition:  Physical Activity: Inactive    Days of Exercise per Week: 0 days    Minutes of Exercise per Session: 0 min     Have you lost any weight without trying in the past 3 months?: No  Body mass index: (!) 26.26  Have you seen the dentist within the past year?: N/A - wear dentures  Health Habits/Nutrition Interventions:  Inadequate physical activity:  patient is not ready to increase his/her physical activity level at this time    Hearing/Vision:  Do you or your family notice any trouble with your hearing that hasn't been managed with hearing aids?: (!) Yes  Do you have difficulty driving, watching TV, or doing any of your daily activities because of your eyesight?: No  Have you had an eye exam within the past year?: (!) No  No results found. Hearing/Vision Interventions:  Hearing concerns:  has aides but does not like to wear them     ADLs:  In the past 7 days, did you need help from others to perform any of the following everyday activities: Eating, dressing, grooming, bathing, toileting, or walking/balance?: (!) Yes  Select all that apply: (!) Bathing  In the past 7 days, did you need help from others to take care of any of the following: Laundry, housekeeping, banking/finances, shopping, telephone use, food preparation, transportation, or taking medications?: (!) Yes  Select all that apply: [employer], Housekeeping, Banking/Finances, Shopping, Telephone Use, Food Preparation, Transportation  ADL Interventions:  Patient declines any further evaluation/treatment for this issue  Family provides 24 h care    Current Health Maintenance Status  Recommendations for Preventive Services Due: see orders.   Recommended screening schedule for the next 5-10 years is provided to the patient in written form: see Patient Instructions/AVS.    PHYSICAL EXAM:  VITALS:  /60 (Site: Left Upper Arm, Position: Sitting, Cuff Size: Medium Adult)   Pulse 78   Ht 5' 1\" (1.549 m)   Wt 139 lb (63 kg)   SpO2 98%   BMI 26.26 kg/m²   BP Readings from Last 5 Encounters:   11/07/22 100/60   05/09/22 118/62   07/06/21 134/78   07/02/21 (!) 153/85   06/16/21 110/68     Wt Readings from Last 5 Encounters: 11/07/22 139 lb (63 kg)   05/09/22 139 lb 6.4 oz (63.2 kg)   12/14/20 144 lb (65.3 kg)   07/28/20 136 lb 3.2 oz (61.8 kg)   06/17/19 126 lb (57.2 kg)   Body mass index is 26.26 kg/m². GENERAL: well-developed, well-nourished, alert, no distress, calm   EYES: negative findings: lids and lashes normal and conjunctivae and sclerae normal  ENT: normal TM's and external ear canals both ears  External nose and ears appear normal  Pharynx: normal. Exudates: None  Lips, mucosa, and tongue normal  Hearing grossly decreased. NECK: No adenopathy, supple, symmetrical, trachea midline  Thyroid not enlarged, symmetric, no tenderness/mass/nodules  no cervical nodes, no supraclavicular nodes  LUNGS:  Breathing unlabored  clear to auscultation bilaterally and good air movement  CARDIOVASC: regular rate and rhythm, descrescendo systolic M LEGS:  Lower extremity edema: none    No carotid bruits  SKIN: warm and dry  No rashes or suspicious lesions  PSYCH:  Alert and oriented  Normal reasoning, insight good  Facial expressions full, mood appropriate  No memory disturbance noted  MUSCULOSKEL:  No significant finger or nail findings  Spine symmetric, no deformities, kyposis present   GAIT: in WC here, uses walker at home. Assessment and Plan:      Diagnosis Orders   1. Medicare annual wellness visit, subsequent        2. Hypertension, essential  Comprehensive Metabolic Panel    CBC with Auto Differential      3. Hyperlipidemia with target LDL less than 130 given age        3. History of CVA (cerebrovascular accident)  CBC with Auto Differential      5. Hemiplegia of right dominant side as late effect of cerebral infarction, unspecified hemiplegia type ([facility] [address]        6. Acquired hypothyroidism        7. Dementia without behavioral disturbance (HCC)        Stable. Plan as above and below. Good control. Current treatment plan is effective, continue same.   ? New murmur : (hold off on eval. 12 yrs ago ECHO  Mild mitral, aortic and tricuspid regurgitation. Not pursue given age. Clinically not in failure. Check blood work for hydration status. INSTRUCTIONS  NEXT APPOINTMENT: Please schedule fasting check-up in 6 months. OK to have water, black coffee and medications. with Dr. Angeles Arias or her NP, Cece Dyer. PLEASE TAKE THIS FORM TO CHECK-OUT WINDOW TO SCHEDULE NEXT VISIT. IF you get any cuts, punctures or other open skin injury, remember that you need a tetanus booster. If  getting lightheaded when stands up.

## 2022-12-04 ENCOUNTER — TELEPHONE (OUTPATIENT)
Dept: FAMILY MEDICINE CLINIC | Age: 87
End: 2022-12-04

## 2022-12-04 NOTE — TELEPHONE ENCOUNTER
Called by ? Daughter. Said pt had severe neck pain. Thought neck was bulging  , thought there was a knot in neck  Would need to take her to the ER for eval.      Paged again by someone asking if I could rx a muscle relaxer. Explained that I can't do that. We don't know what is wrong, her age and not appropriate to call in.

## 2022-12-05 ENCOUNTER — OFFICE VISIT (OUTPATIENT)
Dept: FAMILY MEDICINE CLINIC | Age: 87
End: 2022-12-05
Payer: COMMERCIAL

## 2022-12-05 VITALS
OXYGEN SATURATION: 94 % | HEART RATE: 84 BPM | DIASTOLIC BLOOD PRESSURE: 66 MMHG | RESPIRATION RATE: 16 BRPM | WEIGHT: 139 LBS | BODY MASS INDEX: 26.24 KG/M2 | TEMPERATURE: 98.2 F | SYSTOLIC BLOOD PRESSURE: 124 MMHG | HEIGHT: 61 IN

## 2022-12-05 DIAGNOSIS — N18.31 STAGE 3A CHRONIC KIDNEY DISEASE (HCC): ICD-10-CM

## 2022-12-05 DIAGNOSIS — R59.0 CERVICAL LYMPHADENOPATHY: ICD-10-CM

## 2022-12-05 DIAGNOSIS — M54.2 NECK PAIN ON RIGHT SIDE: Primary | ICD-10-CM

## 2022-12-05 PROCEDURE — G8417 CALC BMI ABV UP PARAM F/U: HCPCS | Performed by: NURSE PRACTITIONER

## 2022-12-05 PROCEDURE — 99214 OFFICE O/P EST MOD 30 MIN: CPT | Performed by: NURSE PRACTITIONER

## 2022-12-05 PROCEDURE — G8484 FLU IMMUNIZE NO ADMIN: HCPCS | Performed by: NURSE PRACTITIONER

## 2022-12-05 PROCEDURE — 1090F PRES/ABSN URINE INCON ASSESS: CPT | Performed by: NURSE PRACTITIONER

## 2022-12-05 PROCEDURE — G8427 DOCREV CUR MEDS BY ELIG CLIN: HCPCS | Performed by: NURSE PRACTITIONER

## 2022-12-05 PROCEDURE — 1123F ACP DISCUSS/DSCN MKR DOCD: CPT | Performed by: NURSE PRACTITIONER

## 2022-12-05 PROCEDURE — 1036F TOBACCO NON-USER: CPT | Performed by: NURSE PRACTITIONER

## 2022-12-05 RX ORDER — ALBUTEROL SULFATE 90 UG/1
2 AEROSOL, METERED RESPIRATORY (INHALATION) 4 TIMES DAILY PRN
Qty: 8.5 EACH | Refills: 2 | Status: SHIPPED | OUTPATIENT
Start: 2022-12-05

## 2022-12-05 ASSESSMENT — ENCOUNTER SYMPTOMS: ABDOMINAL PAIN: 0

## 2022-12-05 NOTE — LETTER
[address] [facility]  Phone: [phone]  Fax: [phone]    WILLARD Staton CNP         December 5, 2022     Patient: Robyn Moran   YOB: 1931   Date of Visit: 12/5/2022       To Whom It May Concern: It is my medical opinion that Robyn Moran requires a disability parking placard for the following reasons:  She cannot walk without assistance from another person or the use of an assistance device (cane, crutch, prosthetic device, wheelchair, etc.). Duration of need: permanent    If you have any questions or concerns, please don't hesitate to call.     Sincerely,      WILLARD Staton CNP

## 2022-12-05 NOTE — PROGRESS NOTES
12/5/2022    This is a 80 y.o. female   Chief Complaint   Patient presents with    Neck Pain     Started Thursday. Rt side of neck. Shooting pain. swelling. pain behind rt ear. Has complained about some ear pain. Other     Needs letter for handicapped placard. Question about timing of medication. Xiao Balling HPI  Patient is here with her daughter. Positive for right side of neck pain since last Thursday 12/1. Denies injury to area. Has tried ibuprofen 400 mg BID which is helping with the pain. Denies fever, increased sinus issues, cough. Patient Active Problem List   Diagnosis    Hypertension, essential    Hypothyroidism    Hemiplegia, late effect of cerebrovascular disease ([facility] [address]- R leg foot drop    Osteopenia- DXA -2.2 (7/14), -1.5 (4/11), -1.8 (8/08)    GERD (gastroesophageal reflux disease)    Chronic low back pain    Spondylosis, cervical    Aortic sclerosis- 2/6 KANCHAN, [place] ECHO    Glaucoma    Retinal detachment    Constipation    Hyperlipidemia with target LDL less than 130 given age    Allergic Conjunctivitis    History of CVA (cerebrovascular accident)    Urge incontinence    Osteoarthritis of lumbar spine    History of compression fracture of spine    Osteoarthritis of spine    Lumbar disc herniation    Dementia without behavioral disturbance (HCC)    Urinary frequency    Needs flu shot- declines    Need for pneumococcal vaccination    Prediabetes    At high risk for falls    Right foot drop          Current Outpatient Medications   Medication Sig Dispense Refill    albuterol sulfate HFA (PROVENTIL;VENTOLIN;PROAIR) 108 (90 Base) MCG/ACT inhaler INHALE 2 PUFFS INTO THE LUNGS 4 TIMES DAILY AS NEEDED FOR WHEEZING.  8.5 each 2    CRANBERRY PO Take 1 caplet by mouth daily      donepezil (ARICEPT) 10 MG tablet TAKE 1 TABLET BY MOUTH EVERY DAY AT NIGHT 90 tablet 3    furosemide (LASIX) 40 MG tablet Take 0.5 tablets by mouth daily 90 tablet 1    levothyroxine (SYNTHROID) 75 MCG tablet TAKE 1 TABLET BY MOUTH EVERY DAY 90 tablet 3    benazepril (LOTENSIN) 20 MG tablet TAKE 1 TABLET BY MOUTH EVERY DAY 90 tablet 1    Incontinence Supply Disposable MISC LARGE. Four times daily as needed incontinence 100 each 5    brimonidine (ALPHAGAN) 0.2 % ophthalmic solution INSTILL 1 DROP INTO LEFT EYE EVERY 12 HOURS      Omega-3 Fatty Acids (FISH OIL) 1000 MG CAPS Take 3 capsules by mouth daily 90 capsule 3    Calcium Carb-Cholecalciferol 500-400 MG-UNIT CHEW Take 1 tablet by mouth daily. Multiple Minerals-Vitamins (MIKE-MAG-ZINC-D) TABS Take 1 tablet by mouth. cetirizine (ZYRTEC) 10 MG tablet Take 10 mg by mouth daily. aspirin 325 MG tablet Take 325 mg by mouth daily. dorzolamide-timolol (COSOPT) 22.3-6.8 MG/ML ophthalmic solution 1 drop 2 times daily. Dr. Terence JERRY       No current facility-administered medications for this visit. Allergies   Allergen Reactions    Bactrim [Sulfamethoxazole-Trimethoprim]     Reglan [Metoclopramide Hcl]        Review of Systems   Constitutional:  Negative for activity change and fever. HENT:  Positive for ear pain. Negative for ear discharge. Cardiovascular:  Negative for chest pain. Gastrointestinal:  Negative for abdominal pain. Musculoskeletal:  Positive for arthralgias and neck pain. Vitals:    12/05/22 1334   BP: 124/66   Site: Right Upper Arm   Position: Sitting   Cuff Size: Medium Adult   Pulse: 84   Resp: 16   Temp: 98.2 °F (36.8 °C)   TempSrc: Oral   SpO2: 94%   Weight: 139 lb (63 kg)   Height: 5' 1\" (1.549 m)       Body mass index is 26.26 kg/m². Wt Readings from Last 3 Encounters:   12/05/22 139 lb (63 kg)   11/07/22 139 lb (63 kg)   05/09/22 139 lb 6.4 oz (63.2 kg)       BP Readings from Last 3 Encounters:   12/05/22 124/66   11/07/22 100/60   05/09/22 118/62       Physical Exam  Vitals and nursing note reviewed. Constitutional:       General: She is not in acute distress. Appearance: She is well-developed.    HENT:      Head: Normocephalic and atraumatic. Cardiovascular:      Rate and Rhythm: Normal rate and regular rhythm. Heart sounds: Murmur heard. Systolic murmur is present with a grade of 3/6. Pulmonary:      Effort: Pulmonary effort is normal. No respiratory distress. Breath sounds: Normal breath sounds. Musculoskeletal:      Cervical back: Neck supple. Tenderness present. No bony tenderness. Decreased range of motion. Comments: Right neck tenderness to palpation. No spinal tenderness. Has decreased cervical spine ROM in all directions. Lymphadenopathy:      Cervical: Cervical adenopathy present. Right cervical: Superficial cervical adenopathy present. Comments: One mid superficial cervical lymph node is enlarged. Skin:     General: Skin is warm and dry. Neurological:      Mental Status: She is alert. Psychiatric:         Behavior: Behavior normal.       Assessmentand Plan  Rio Stover was seen today for neck pain     Diagnoses and all orders for this visit:    Neck pain on right side  Likely neck strain  Advised to alternate between ice and heat PRN  Due to CKD, would like her to avoid NSAIDs and take tylenol 500 mg QID PRN  Advised to start home PT to help with ROM and decrease pain. Cervical lymphadenopathy  Has one right cervical enlarged lymph node. I do not think this is causing her neck pain. Daughter is unsure how long it has been enlarged. CBC was normal 11/7/22. Daughter declines additional testing (due to age and other chronic health problems) with CT scan at this time. Will continue to monitor area. Return in about 4 weeks (around 1/2/2023), or if symptoms worsen or fail to improve, for neck pain , with Dr. Reyna Pedraza.

## 2022-12-06 ENCOUNTER — TELEPHONE (OUTPATIENT)
Dept: FAMILY MEDICINE CLINIC | Age: 87
End: 2022-12-06

## 2022-12-06 DIAGNOSIS — M54.2 NECK PAIN ON RIGHT SIDE: Primary | ICD-10-CM

## 2022-12-06 RX ORDER — PREDNISONE 20 MG/1
20 TABLET ORAL 2 TIMES DAILY
Qty: 10 TABLET | Refills: 0 | Status: SHIPPED | OUTPATIENT
Start: 2022-12-06 | End: 2022-12-11

## 2022-12-06 NOTE — TELEPHONE ENCOUNTER
Monitor lymph node for now- likely not the cause of her neck pain    I sent in prescription for a steroid to take for her neck pain    OK to take meds at the same time, but needs to be on empty stomach

## 2022-12-06 NOTE — TELEPHONE ENCOUNTER
Pt was seen yesterday for pain in her neck   Pt is complaining about pain shooting up her neck daughter wants maybe a ultrasound rather than a ct scan she does not see her mom drinking something for a ct scan and following directions   Daughter asked if it is ok the pt takes her thyroid and bp medication at the same time      Please call Janeen Bloom to advise

## 2022-12-06 NOTE — TELEPHONE ENCOUNTER
Informed DOP of this. She asked if it would make of sleepy or off balance when gets us.  Per Son Rand, no, but could make her a little more active than normal

## 2022-12-22 DIAGNOSIS — I10 ESSENTIAL HYPERTENSION: ICD-10-CM

## 2022-12-22 RX ORDER — BENAZEPRIL HYDROCHLORIDE 20 MG/1
TABLET ORAL
Qty: 90 TABLET | Refills: 1 | Status: SHIPPED | OUTPATIENT
Start: 2022-12-22

## 2023-01-12 ENCOUNTER — TELEPHONE (OUTPATIENT)
Dept: FAMILY MEDICINE CLINIC | Age: 88
End: 2023-01-12

## 2023-01-12 NOTE — TELEPHONE ENCOUNTER
Should have appt for evaluation. Might need referral to wound care if area is significant. Should change positions frequently to keep pressure off the area.

## 2023-01-12 NOTE — TELEPHONE ENCOUNTER
Spoke to DOP and she didn't notice anything when she gave her a bath this morning. They have already gotten a donut for her to sit on. She will check again and call back after she checks for redness.

## 2023-01-12 NOTE — TELEPHONE ENCOUNTER
----- Message from Alysia Milad sent at 1/12/2023 10:12 AM EST -----  Subject: Message to Provider    QUESTIONS  Information for Provider? Patient has compression sore on her bottom and   was requesting something be ordered and sent to the pharmacy for the   sores. ---------------------------------------------------------------------------  --------------  Angelo Stern [place]  [phone]; OK to leave message on voicemail  ---------------------------------------------------------------------------  --------------  SCRIPT ANSWERS  Relationship to Patient? Other  Representative Name? Carrie Neal - daughter  Is the Representative on the appropriate HIPAA document in Epic?  Yes

## 2023-01-16 ENCOUNTER — TELEPHONE (OUTPATIENT)
Dept: FAMILY MEDICINE CLINIC | Age: 88
End: 2023-01-16

## 2023-01-16 ENCOUNTER — OFFICE VISIT (OUTPATIENT)
Dept: FAMILY MEDICINE CLINIC | Age: 88
End: 2023-01-16
Payer: COMMERCIAL

## 2023-01-16 VITALS
HEART RATE: 74 BPM | WEIGHT: 139 LBS | DIASTOLIC BLOOD PRESSURE: 60 MMHG | OXYGEN SATURATION: 99 % | SYSTOLIC BLOOD PRESSURE: 120 MMHG | HEIGHT: 61 IN | BODY MASS INDEX: 26.24 KG/M2

## 2023-01-16 DIAGNOSIS — L89.152 PRESSURE INJURY OF SACRAL REGION, STAGE 2 (HCC): Primary | ICD-10-CM

## 2023-01-16 DIAGNOSIS — F03.90 DEMENTIA WITHOUT BEHAVIORAL DISTURBANCE (HCC): ICD-10-CM

## 2023-01-16 DIAGNOSIS — L89.152 PRESSURE INJURY OF SACRAL REGION, STAGE 2 (HCC): ICD-10-CM

## 2023-01-16 DIAGNOSIS — I69.351 HEMIPLEGIA OF RIGHT DOMINANT SIDE AS LATE EFFECT OF CEREBRAL INFARCTION, UNSPECIFIED HEMIPLEGIA TYPE (HCC): ICD-10-CM

## 2023-01-16 PROCEDURE — 1123F ACP DISCUSS/DSCN MKR DOCD: CPT | Performed by: FAMILY MEDICINE

## 2023-01-16 PROCEDURE — G8427 DOCREV CUR MEDS BY ELIG CLIN: HCPCS | Performed by: FAMILY MEDICINE

## 2023-01-16 PROCEDURE — 1036F TOBACCO NON-USER: CPT | Performed by: FAMILY MEDICINE

## 2023-01-16 PROCEDURE — 99213 OFFICE O/P EST LOW 20 MIN: CPT | Performed by: FAMILY MEDICINE

## 2023-01-16 PROCEDURE — G8417 CALC BMI ABV UP PARAM F/U: HCPCS | Performed by: FAMILY MEDICINE

## 2023-01-16 PROCEDURE — 1090F PRES/ABSN URINE INCON ASSESS: CPT | Performed by: FAMILY MEDICINE

## 2023-01-16 PROCEDURE — G8484 FLU IMMUNIZE NO ADMIN: HCPCS | Performed by: FAMILY MEDICINE

## 2023-01-16 RX ORDER — OSTOMY SUPPLY 1"
EACH MISCELLANEOUS
Qty: 10 PATCH | Refills: 2 | Status: SHIPPED | OUTPATIENT
Start: 2023-01-16

## 2023-01-16 RX ORDER — OSTOMY SUPPLY 1"
EACH MISCELLANEOUS
Qty: 10 PATCH | Refills: 2 | Status: SHIPPED | OUTPATIENT
Start: 2023-01-16 | End: 2023-01-16 | Stop reason: SDUPTHER

## 2023-01-16 ASSESSMENT — PATIENT HEALTH QUESTIONNAIRE - PHQ9
SUM OF ALL RESPONSES TO PHQ QUESTIONS 1-9: 0
1. LITTLE INTEREST OR PLEASURE IN DOING THINGS: 0
SUM OF ALL RESPONSES TO PHQ QUESTIONS 1-9: 0
SUM OF ALL RESPONSES TO PHQ9 QUESTIONS 1 & 2: 0
2. FEELING DOWN, DEPRESSED OR HOPELESS: 0

## 2023-01-16 NOTE — PROGRESS NOTES
PROBLEM VISIT NOTE     Subjective:     Chief Complaint   Patient presents with    Skin Problem     Skin irritation on buttock, got Prednisone, caused lots of problems. COA going to send someone to help during the day to stay with her. Fayette Kanner is a 80 y.o. female who presents sore buttox x 1 wk    Only took prednisone 20 mg once for neck pain. Agitated, could not sleep. Dementia worsening. Has more supervision now at med times. Watching her on camera. HHA 6-7 h over the day. Not wandering. Has door alarms. Using AD ointment. Health Maintenance Due   Topic Date Due    Shingles vaccine (1 of 2) Never done    DTaP/Tdap/Td vaccine (2 - Td or Tdap) 08/13/2018     CHART REVIEW   reports that she has never smoked. She has never used smokeless tobacco.  Health Maintenance Due   Topic Date Due    Shingles vaccine (1 of 2) Never done    DTaP/Tdap/Td vaccine (2 - Td or Tdap) 08/13/2018     Current Outpatient Medications   Medication Instructions    albuterol sulfate HFA (PROVENTIL;VENTOLIN;PROAIR) 108 (90 Base) MCG/ACT inhaler 2 puffs, Inhalation, 4 TIMES DAILY PRN    aspirin 325 mg, DAILY    benazepril (LOTENSIN) 20 MG tablet TAKE 1 TABLET BY MOUTH EVERY DAY    brimonidine (ALPHAGAN) 0.2 % ophthalmic solution INSTILL 1 DROP INTO LEFT EYE EVERY 12 HOURS    Calcium Carb-Cholecalciferol 500-400 MG-UNIT CHEW 1 tablet, Oral, DAILY    cetirizine (ZYRTEC) 10 mg, DAILY    Control Gel Formula Dressing (DUODERM CGF EXTRA THIN) MISC Every 3-7 days until healed    CRANBERRY PO 1 caplet, Oral, DAILY    donepezil (ARICEPT) 10 MG tablet TAKE 1 TABLET BY MOUTH EVERY DAY AT NIGHT    dorzolamide-timolol (COSOPT) 22.3-6.8 MG/ML ophthalmic solution 1 drop, 2 TIMES DAILY    fish oil 3,000 mg, Oral, DAILY    furosemide (LASIX) 20 mg, Oral, DAILY    Incontinence Supply Disposable MISC LARGE.  Four times daily as needed incontinence    levothyroxine (SYNTHROID) 75 MCG tablet TAKE 1 TABLET BY MOUTH EVERY DAY    Multiple Minerals-Vitamins (MIKE-MAG-ZINC-D) TABS 1 tablet, Oral     LAST LABS  LDL Calculated   Date Value Ref Range Status   02/26/2019 123 (H) <100 mg/dL Final     Lab Results   Component Value Date    HDL 63 (H) 02/26/2019     Lab Results   Component Value Date    TRIG 123 02/26/2019     Lab Results   Component Value Date     11/07/2022    K 4.1 11/07/2022    CREATININE 1.1 11/07/2022     Lab Results   Component Value Date    WBC 5.6 11/07/2022    HGB 12.7 11/07/2022     11/07/2022     Lab Results   Component Value Date    ALT 16 11/07/2022    AST 19 11/07/2022    ALKPHOS 62 11/07/2022    BILITOT 0.3 11/07/2022     TSH (uIU/mL)   Date Value   05/09/2022 3.53     Lab Results   Component Value Date    LABA1C 5.9 06/16/2021    LABA1C 5.6 02/26/2019    LABA1C 5.7 02/15/2018     Objective:   PHYSICAL EXAM   /60 (Site: Right Upper Arm, Position: Sitting, Cuff Size: Medium Adult)   Pulse 74   Ht 5' 1\" (1.549 m)   Wt 139 lb (63 kg)   SpO2 99%   BMI 26.26 kg/m²   BP Readings from Last 5 Encounters:   01/16/23 120/60   12/05/22 124/66   11/07/22 100/60   05/09/22 118/62   07/06/21 134/78     Wt Readings from Last 5 Encounters:   01/16/23 139 lb (63 kg)   12/05/22 139 lb (63 kg)   11/07/22 139 lb (63 kg)   05/09/22 139 lb 6.4 oz (63.2 kg)   12/14/20 144 lb (65.3 kg)      GENERAL:   well-developed, well-nourished, alert, no distress. LUNGS:    Breathing unlabored  SKIN: between upper buttox kissing 1.5 cm hypopigmented region. The right side with redness and dermal disruption. No masses or cellulitis. Assessment and Plan:      Diagnosis Orders   1. Pressure injury of sacral region, stage 2 (HCC)  Control Gel Formula Dressing (DUODERM CGF EXTRA THIN) MISC      2. Dementia without behavioral disturbance ([address]        3. Hemiplegia of right dominant side as late effect of cerebral infarction, unspecified hemiplegia type ([address]             INSTRUCTIONS  Keep using gel cushion on chair.   After bathing, apply patch to affected area. Will reduce friction and keep area clean.  Call if getting worse.

## 2023-01-16 NOTE — PATIENT INSTRUCTIONS
INSTRUCTIONS  Keep using gel cushion on chair. After bathing, apply patch to affected area. Will reduce friction and keep area clean. Call if getting worse. Patient Education      TAKING CARE PRESSURE SORES    A pressure sore is any redness or break in the skin caused by too much pressure on your skin for too long a period of time. The pressure prevents blood from getting to your skin so the skin dies. Normally the nerves send messages of pain or feelings of discomfort to your brain to let you know that you need to change position, but damage to your spinal cord keeps these messages from reaching your brain. You may need to learn new ways to change your position to prevent too much pressure. Pressure sores can occur, for example, when you sit or lie in one position too long. Shearing is also a kind of pressure injury. It happens when the skin moves one way and the bone underneath it moves another way. An example of this is if you slouch when you sit. Another type of injury, an abrasion, can occur when pulling yourself across a surface instead of lifting. This is an example of a friction injury. In addition, short exposure to high pressure, such as a bump or fall, may cause damage to the skin which may not show up right away. Stages of pressure sores and how to care for them:     STAGE ONE       How to recognize: Skin is not broken but is red or discolored. The redness or change in color does not fade within 30 minutes after pressure is removed. What to do:   1. Keep pressure off the sore! 2. Maintain good hygiene. Wash with mild soap and water, rinse well, pat dry carefully (but gently). Do not rub vigorously directly over   the wound. 3. Evaluate your diet - are you getting enough protein, calories, vitamins A and C, zinc and iron? All of these are necessary for healthy skin.    4. Review your mattress, wheelchair cushion, transfers, pressure releases, and turning techniques for possible cause of the problem. If the sore seems to be caused by friction, sometimes a protective transparent dressing such as Op-Site or Tegaderm may help protect the area by allowing the skin to slide easily. If the sore does not heal in a few days or recurs, consult your health care provider. STAGE TWO       How to recognize: The epidermis or topmost layer of the skin is broken, creating a shallow open sore. Drainage may or may not be present. What to do: Follow steps 1-4 under Stage One. Consult your health care provider for further treatment, which may include the followin. Cleanse the wound with water or saline solution and dry carefully. Apply either a thin foam dressing (such as Allevyn), a hydrocolloid dressing (such as DuoDERM), or saline dampened gauze. The first two types of dressing can be left on until they wrinkle or loosen (up to 5 days). If using gauze, it should be changed twice a day and should remain damp between dressing changes. 6. Check for signs of wound healing with each dressing change. 7. If there are signs of infection (see Signs of Trouble , below), consult your health care provider for alternative wound care ideas and review of possible causes (see step 4 under Stage One). STAGE THREE       How to recognize: The break in the skin extends through the dermis (second skin layer) into the subcutaneous and fat tissue. The wound is deeper than in Stage Two. What to do: Follow steps 1-4 under Stage One and 5 - 7 under Stage Two. Always consult your health care provider. Wounds in this stage frequently need additional wound care with special cleaning or debriding agents. Different packing agents, and occasionally, antibiotics (creams or oral pills) may be required. You may also qualify for a special bed or pressure-relieving mattress that can be ordered by your health care provider.    STAGE FOUR       How to recognize: The breakdown extends into the muscle and can extend as far down as the bone. Usually lots of dead tissue and drainage are present. What to do: Consult your health care provider right away . Surgery is frequently required for this type of wound. How to know if the sore is healing   The sore will get smaller. Pinkish tissue usually starts forming along the edges of the sore and moves toward the center; you may notice either smooth or bumpy surfaces of new tissue. Some bleeding may be present. This shows that there is good blood circulation to the area, which helps healing. Signs of trouble  You need to seek help if any of the following occurs: An increase in the size or drainage of the sore. Increased redness around the sore or black areas starting to form. The sore starts smelling and/or the drainage becomes a greenish color. You develop a fever. What kind of complications can be caused by pressure sores? Can be life threatening. Infection can spread to the blood, heart, bone. Amputations. Prolonged bed rest.   Autonomic dysreflexia. Reapplying pressure: Only when a pressure sore is completely healed can pressure be reapplied over the area. Complete healing means that the outer layer of skin (the epidermis) is unbroken and normal coloring has returned to the area. Sometimes a scar may remain. A scar is healed tissue but will remain a somewhat different color from the surrounding skin. Scar tissue is never as tough as uninjured skin. The first time pressure is applied to a newly healed area, it must be for a very short time (15 minutes at most). Then remove pressure from the area and inspect it for redness (erythema). If there is redness, observe it and time carefully how long it takes to fade to the color of the surrounding tissue (or the color it was before the pressure was applied in the case of a scar). If fading occurs in 15 minutes or less, no damage has occurred. Wait at least one hour and repeat pressure application.    After three successful 15-minute trials, increase to 30 minutes and repeat the above process using the guideline of a 15-minute or less fading time as a successful trial. After three successful 30-minute trials, you may increase the time by 30 minutes each day. If at any point a trial is not successful, hold at that level until three successful trials are accomplished. In this way, you will rebuild your skin pressure tolerance.

## 2023-01-16 NOTE — TELEPHONE ENCOUNTER
I called the pharmacy. They said they do not carry this type of medicine. Need to send to a medical supply pharmacy like [facility]  . Will informed DOP of this.

## 2023-01-26 ENCOUNTER — TELEPHONE (OUTPATIENT)
Dept: FAMILY MEDICINE CLINIC | Age: 88
End: 2023-01-26

## 2023-01-26 NOTE — TELEPHONE ENCOUNTER
Daughter lisa calling in regards to the furosemide 40mg  Pt is not having any swelling anymore so they are wondering if she is still needing to take the furosemide anymore  Please advise     Daughter is on hippa

## 2023-01-26 NOTE — TELEPHONE ENCOUNTER
Tried DOP number with and without a 1, not able to connect. Tried home number for patient, mail box was full.

## 2023-02-02 ENCOUNTER — TELEMEDICINE (OUTPATIENT)
Dept: FAMILY MEDICINE CLINIC | Age: 88
End: 2023-02-02
Payer: COMMERCIAL

## 2023-02-02 DIAGNOSIS — R31.9 HEMATURIA, UNSPECIFIED TYPE: ICD-10-CM

## 2023-02-02 DIAGNOSIS — R46.89 BEHAVIORAL CHANGE: Primary | ICD-10-CM

## 2023-02-02 PROCEDURE — 1123F ACP DISCUSS/DSCN MKR DOCD: CPT | Performed by: FAMILY MEDICINE

## 2023-02-02 PROCEDURE — 1090F PRES/ABSN URINE INCON ASSESS: CPT | Performed by: FAMILY MEDICINE

## 2023-02-02 PROCEDURE — G8427 DOCREV CUR MEDS BY ELIG CLIN: HCPCS | Performed by: FAMILY MEDICINE

## 2023-02-02 PROCEDURE — 99213 OFFICE O/P EST LOW 20 MIN: CPT | Performed by: FAMILY MEDICINE

## 2023-02-02 RX ORDER — NITROFURANTOIN 25; 75 MG/1; MG/1
100 CAPSULE ORAL 2 TIMES DAILY
Qty: 20 CAPSULE | Refills: 0 | Status: SHIPPED | OUTPATIENT
Start: 2023-02-02 | End: 2023-02-12

## 2023-02-02 NOTE — PROGRESS NOTES
2023    TELEHEALTH EVALUATION -- Audio/Visual (During ZRUBI-77 public health emergency)    HPI:    Annalisa Urrutia (:  1931) has requested an audio/video evaluation for the following concern(s):    Chief Complaint   Patient presents with    Dementia     Feeling like she was having dementia spells. Someone said she could have a uti, not going to the bathroom any more than usual.      Virtual video visit for with daughter Jose Morales. Daughter says starting yesterday she had spells of disorientation, agitated, imbalanced. Has hx dementia. More agitated and animated during the day. Resting less well- up more last night. In the past this has been due to a UTI. Daughter tried to push fluids past 2 days. Has noted more urine incontinence and maybe a little blood. Urine is darker. No f/c  No n/v  Denies reports of abd or back pain. Is eating normally today. Is very difficult to transport. No recent UTI.     Review of Systems As above     Patient Active Problem List   Diagnosis    Hypertension, essential    Hypothyroidism    Hemiplegia, late effect of cerebrovascular disease ([address]- R leg foot drop    Osteopenia- DXA -2.2 (), -1.5 (), -1.8 ()    GERD (gastroesophageal reflux disease)    Chronic low back pain    Spondylosis, cervical    Aortic sclerosis- 2/6 KANCHAN, NL ECHO    Glaucoma    Retinal detachment    Constipation    Hyperlipidemia with target LDL less than 130 given age    Allergic Conjunctivitis    History of CVA (cerebrovascular accident)    Urge incontinence    Osteoarthritis of lumbar spine    History of compression fracture of spine    Osteoarthritis of spine    Lumbar disc herniation    Dementia without behavioral disturbance (HCC)    Urinary frequency    Needs flu shot- declines    Need for pneumococcal vaccination    Prediabetes    At high risk for falls    Right foot drop    Stage 3a chronic kidney disease ([address]        Prior to Visit Medications Medication Sig Taking? Authorizing Provider   Control Gel Formula Dressing (DUODERM CGF EXTRA THIN) MISC Every 3-7 days until healed Yes Cheri Wolf MD   benazepril (LOTENSIN) 20 MG tablet TAKE 1 TABLET BY MOUTH EVERY DAY Yes Cheri Wolf MD   albuterol sulfate HFA (PROVENTIL;VENTOLIN;PROAIR) 108 (90 Base) MCG/ACT inhaler INHALE 2 PUFFS INTO THE LUNGS 4 TIMES DAILY AS NEEDED FOR WHEEZING. Yes Cheri Wolf MD   CRANBERRY PO Take 1 caplet by mouth daily Yes Historical Provider, MD   donepezil (ARICEPT) 10 MG tablet TAKE 1 TABLET BY MOUTH EVERY DAY AT NIGHT Yes Cheri Wolf MD   furosemide (LASIX) 40 MG tablet Take 0.5 tablets by mouth daily Yes Cheri Wolf MD   levothyroxine (SYNTHROID) 75 MCG tablet TAKE 1 TABLET BY MOUTH EVERY DAY Yes Cheri Wolf MD   Incontinence Supply Disposable MISC LARGE. Four times daily as needed incontinence Yes Cheri Wolf MD   brimonidine (ALPHAGAN) 0.2 % ophthalmic solution INSTILL 1 DROP INTO LEFT EYE EVERY 12 HOURS Yes Historical Provider, MD   Omega-3 Fatty Acids (FISH OIL) 1000 MG CAPS Take 3 capsules by mouth daily Yes Cheri Wolf MD   Calcium Carb-Cholecalciferol 500-400 MG-UNIT CHEW Take 1 tablet by mouth daily. Yes Cheri Wolf MD   Multiple Minerals-Vitamins (MIKE-MAG-ZINC-D) TABS Take 1 tablet by mouth. Yes Cheri Wolf MD   cetirizine (ZYRTEC) 10 MG tablet Take 10 mg by mouth daily. Yes Historical Provider, MD   aspirin 325 MG tablet Take 325 mg by mouth daily. Yes Historical Provider, MD   dorzolamide-timolol (COSOPT) 22.3-6.8 MG/ML ophthalmic solution 1 drop 2 times daily. Dr. Dash Rodriguez - GALLO Yes Historical Provider, MD       Social History     Tobacco Use    Smoking status: Never    Smokeless tobacco: Never    Tobacco comments:     congrats   Substance Use Topics    Alcohol use: No    Drug use: No            PHYSICAL EXAMINATION:  Physical Exam  Constitutional:       General: She is not in acute distress. Appearance: Normal appearance.  She is not ill-appearing. Pulmonary:      Effort: Pulmonary effort is normal.   Neurological:      General: No focal deficit present. Mental Status: She is alert and oriented to person, place, and time. Mental status is at baseline. Psychiatric:         Mood and Affect: Mood normal.         Behavior: Behavior normal.         Thought Content: Thought content normal.         Judgment: Judgment normal.        ASSESSMENT/PLAN:     Diagnosis Orders   1. Behavioral change  Culture, Urine    Urinalysis      2. Hematuria, unspecified type  Culture, Urine    Urinalysis        Talked with daughter about plan: She will  a hat and specimen container from our office  She will submit urine specimen to lab in [address] asap (refrigerate urine if needed overnight)  Start Macrobid 100 mg bid after specimen collection  Encourage hydration    Report any changes in status, or go to ER if lethargic or signs of severe infection (fever vomiting, severe pain)    F/u karuna Arias Jessica was evaluated through a synchronous (real-time) audio-video encounter. The patient (or guardian if applicable) is aware that this is a billable service, which includes applicable co-pays. This Virtual Visit was conducted with patient's (and/or legal guardian's) consent. The visit was conducted pursuant to the emergency declaration under the [facility], [address] [place] [address] authority and the [employer] General Act. Patient identification was verified, and a caregiver was present when appropriate. The patient was located in a state where the provider was licensed to provide care. Patient identification was verified at the start of the visit: Yes    Services were provided through a video synchronous discussion virtually to substitute for in-person clinic visit. Patient and provider were located at their individual homes.     --Ada Ruth MD on 2/2/2023 at 1:54 PM    An electronic signature was used to authenticate this note.

## 2023-02-03 DIAGNOSIS — R46.89 BEHAVIORAL CHANGE: ICD-10-CM

## 2023-02-03 DIAGNOSIS — R31.9 HEMATURIA, UNSPECIFIED TYPE: ICD-10-CM

## 2023-02-03 LAB
BACTERIA: NORMAL /HPF
BILIRUBIN URINE: NEGATIVE
BLOOD, URINE: NEGATIVE
CLARITY: CLEAR
COLOR: YELLOW
EPITHELIAL CELLS, UA: 4 /HPF (ref 0–5)
GLUCOSE URINE: NEGATIVE MG/DL
HYALINE CASTS: 0 /LPF (ref 0–8)
KETONES, URINE: NEGATIVE MG/DL
LEUKOCYTE ESTERASE, URINE: ABNORMAL
MICROSCOPIC EXAMINATION: YES
NITRITE, URINE: NEGATIVE
PH UA: 5.5 (ref 5–8)
PROTEIN UA: NEGATIVE MG/DL
RBC UA: 0 /HPF (ref 0–4)
SPECIFIC GRAVITY UA: 1.02 (ref 1–1.03)
URINE TYPE: ABNORMAL
UROBILINOGEN, URINE: 0.2 E.U./DL
WBC UA: 3 /HPF (ref 0–5)

## 2023-02-04 LAB — URINE CULTURE, ROUTINE: NORMAL

## 2023-02-06 ENCOUNTER — TELEPHONE (OUTPATIENT)
Dept: FAMILY MEDICINE CLINIC | Age: 88
End: 2023-02-06

## 2023-02-06 NOTE — TELEPHONE ENCOUNTER
----- Message from Gabriela Nicholas sent at 2/6/2023 10:15 AM EST -----  Subject: Message to Provider    QUESTIONS  Information for Provider? Binta Ramsay the daughter needs a list of medication   that mom is allergic to   ---------------------------------------------------------------------------  --------------  [address]  [phone]; OK to leave message on voicemail  ---------------------------------------------------------------------------  --------------  SCRIPT ANSWERS  Relationship to Patient? Other  Representative Name? Chris Wong   Is the Representative on the appropriate HIPAA document in Epic?  Yes

## 2023-02-13 RX ORDER — ALBUTEROL SULFATE 90 UG/1
2 AEROSOL, METERED RESPIRATORY (INHALATION) 4 TIMES DAILY PRN
Qty: 8.5 EACH | Refills: 2 | Status: SHIPPED | OUTPATIENT
Start: 2023-02-13

## 2023-02-15 ENCOUNTER — TELEPHONE (OUTPATIENT)
Dept: FAMILY MEDICINE CLINIC | Age: 88
End: 2023-02-15

## 2023-02-15 NOTE — TELEPHONE ENCOUNTER
I spoke to Johnnie Stuart. She is back in [place] now. She is taking Melatonin most nights, but not every night and not sure of strength. Can do virtual but sOorio Coronel will have to do it. She will be home by 4:00 pm. We can call her at [phone] and send link to that number.

## 2023-02-15 NOTE — TELEPHONE ENCOUNTER
DOP called in wants something prescribed to sleep they have tried melatonin not helping daughter stated the pt is thomas since she ius sleep deprived     Tigist Maher daughter   [phone]

## 2023-02-15 NOTE — TELEPHONE ENCOUNTER
Can we do a VV to discuss tomorrow. All meds come with side effects at this age so we need to give careful consideration to options. Make sure trying 5-10 mg melatonin tonight.

## 2023-03-10 ENCOUNTER — TELEPHONE (OUTPATIENT)
Dept: FAMILY MEDICINE CLINIC | Age: 88
End: 2023-03-10

## 2023-03-10 DIAGNOSIS — R53.1 WEAKNESS: ICD-10-CM

## 2023-03-10 DIAGNOSIS — Z91.81 AT HIGH RISK FOR FALLS: ICD-10-CM

## 2023-03-10 DIAGNOSIS — I69.351 HEMIPLEGIA OF RIGHT DOMINANT SIDE AS LATE EFFECT OF CEREBRAL INFARCTION, UNSPECIFIED HEMIPLEGIA TYPE (HCC): Primary | ICD-10-CM

## 2023-03-10 NOTE — TELEPHONE ENCOUNTER
DOP called in asking if her Mom can get a prescription for a lift chair since she has a hard time getting up & down     She would like to get this from [facility]     Please call LISA to let her know if this is possible

## 2023-03-28 ENCOUNTER — TELEPHONE (OUTPATIENT)
Dept: FAMILY MEDICINE CLINIC | Age: 88
End: 2023-03-28

## 2023-03-28 DIAGNOSIS — M47.816 SPONDYLOSIS OF LUMBAR REGION WITHOUT MYELOPATHY OR RADICULOPATHY: ICD-10-CM

## 2023-03-28 DIAGNOSIS — I69.351 HEMIPLEGIA OF RIGHT DOMINANT SIDE AS LATE EFFECT OF CEREBRAL INFARCTION, UNSPECIFIED HEMIPLEGIA TYPE (HCC): ICD-10-CM

## 2023-03-28 DIAGNOSIS — Z91.81 AT HIGH RISK FOR FALLS: Primary | ICD-10-CM

## 2023-03-28 DIAGNOSIS — Z86.73 HISTORY OF CVA (CEREBROVASCULAR ACCIDENT): ICD-10-CM

## 2023-03-28 DIAGNOSIS — R53.1 WEAKNESS: ICD-10-CM

## 2023-03-28 NOTE — TELEPHONE ENCOUNTER
DOP calling in. Stated that pt fell a week and a half ago and is still sore in her lower back and hips. [facility] went out to see pt and had xrays done. DOP would like to get a referral for PT to go into the home for therapy.      Please Advise  LISA Milton can be reached at [phone]

## 2023-03-29 NOTE — TELEPHONE ENCOUNTER
Was discharged from [facility] in January  Will fax referral to them.  Hopefully they can pick her back up

## 2023-03-30 ENCOUNTER — TELEPHONE (OUTPATIENT)
Dept: FAMILY MEDICINE CLINIC | Age: 88
End: 2023-03-30

## 2023-03-31 ENCOUNTER — TELEPHONE (OUTPATIENT)
Dept: FAMILY MEDICINE CLINIC | Age: 88
End: 2023-03-31

## 2023-03-31 ENCOUNTER — TELEMEDICINE (OUTPATIENT)
Dept: FAMILY MEDICINE CLINIC | Age: 88
End: 2023-03-31
Payer: COMMERCIAL

## 2023-03-31 DIAGNOSIS — G47.00 INSOMNIA, UNSPECIFIED TYPE: Primary | ICD-10-CM

## 2023-03-31 DIAGNOSIS — M54.50 CHRONIC MIDLINE LOW BACK PAIN, UNSPECIFIED WHETHER SCIATICA PRESENT: ICD-10-CM

## 2023-03-31 DIAGNOSIS — F03.90 DEMENTIA WITHOUT BEHAVIORAL DISTURBANCE (HCC): ICD-10-CM

## 2023-03-31 DIAGNOSIS — G89.29 CHRONIC MIDLINE LOW BACK PAIN, UNSPECIFIED WHETHER SCIATICA PRESENT: ICD-10-CM

## 2023-03-31 PROCEDURE — 99214 OFFICE O/P EST MOD 30 MIN: CPT

## 2023-03-31 PROCEDURE — G8417 CALC BMI ABV UP PARAM F/U: HCPCS

## 2023-03-31 PROCEDURE — 1090F PRES/ABSN URINE INCON ASSESS: CPT

## 2023-03-31 PROCEDURE — G8427 DOCREV CUR MEDS BY ELIG CLIN: HCPCS

## 2023-03-31 PROCEDURE — 1123F ACP DISCUSS/DSCN MKR DOCD: CPT

## 2023-03-31 PROCEDURE — G8484 FLU IMMUNIZE NO ADMIN: HCPCS

## 2023-03-31 PROCEDURE — 1036F TOBACCO NON-USER: CPT

## 2023-03-31 RX ORDER — MIRTAZAPINE 7.5 MG/1
7.5 TABLET, FILM COATED ORAL NIGHTLY
Qty: 30 TABLET | Refills: 0 | Status: SHIPPED | OUTPATIENT
Start: 2023-03-31

## 2023-03-31 RX ORDER — LIDOCAINE 50 MG/G
1 PATCH TOPICAL DAILY
Qty: 30 PATCH | Refills: 0 | Status: SHIPPED | OUTPATIENT
Start: 2023-03-31 | End: 2023-04-30

## 2023-03-31 RX ORDER — RAMELTEON 8 MG/1
8 TABLET ORAL NIGHTLY PRN
Qty: 90 TABLET | Refills: 3 | Status: SHIPPED | OUTPATIENT
Start: 2023-03-31 | End: 2023-03-31 | Stop reason: CLARIF

## 2023-03-31 SDOH — ECONOMIC STABILITY: HOUSING INSECURITY
IN THE LAST 12 MONTHS, WAS THERE A TIME WHEN YOU DID NOT HAVE A STEADY PLACE TO SLEEP OR SLEPT IN A SHELTER (INCLUDING NOW)?: NO

## 2023-03-31 SDOH — ECONOMIC STABILITY: FOOD INSECURITY: WITHIN THE PAST 12 MONTHS, THE FOOD YOU BOUGHT JUST DIDN'T LAST AND YOU DIDN'T HAVE MONEY TO GET MORE.: NEVER TRUE

## 2023-03-31 SDOH — ECONOMIC STABILITY: INCOME INSECURITY: HOW HARD IS IT FOR YOU TO PAY FOR THE VERY BASICS LIKE FOOD, HOUSING, MEDICAL CARE, AND HEATING?: NOT HARD AT ALL

## 2023-03-31 SDOH — ECONOMIC STABILITY: FOOD INSECURITY: WITHIN THE PAST 12 MONTHS, YOU WORRIED THAT YOUR FOOD WOULD RUN OUT BEFORE YOU GOT MONEY TO BUY MORE.: NEVER TRUE

## 2023-03-31 ASSESSMENT — ENCOUNTER SYMPTOMS
ABDOMINAL PAIN: 0
SHORTNESS OF BREATH: 0

## 2023-03-31 NOTE — PROGRESS NOTES
Snehal El (:  1931) is a Established patient, here for evaluation of the following:    Insomnia (Patient has had trouble sleeping)      Assessment & Plan   Below is the assessment and plan developed based on review of pertinent history, physical exam, labs, studies, and medications. 1. Insomnia, unspecified type  -     ramelteon (ROZEREM) 8 MG tablet; Take 1 tablet by mouth nightly as needed for Sleep, Disp-90 tablet, R-3Normal  Will try ramelteon- advised to watch for dizziness, drowsiness and be cautious about falls  I agree that night time help would be beneficial for Massimo Trevizo  2. Dementia without behavioral disturbance ([facility]  Provided reassurance  I agree that night time help would be beneficial for Massimo Trevizo as she is having more trouble sleeping and becoming more agitated- family still declining nursing home  3. Chronic midline low back pain, unspecified whether sciatica present  -     lidocaine (LIDODERM) 5 %; Place 1 patch onto the skin daily 12 hours on, 12 hours off., Disp-30 patch, R-0Normal  Advised Tylenol arthritis strength and lidocaine patches  Continue heat and ice  Return in about 5 weeks (around 2023). Subjective   HPI  Here with daughter to discuss insomnia  Patient was up every hour or so  Sometimes she is combative and argumentative  She gets up and wanders without her walker  Had a recent fall- x-rays were negative for fracture  Has been taking tylenol for pain  Pain seems to be in mid low back    She does much better when she is rested- the more tired and agitated she is, the worse her agitation and the more difficult it is to get her to settle in for the night. Family is hoping to get overnight help as they are the primary caregivers and Massimo Trevizo is not sleeping overnight which results in her children not getting much sleep either. They do not want to put her in a nursing home at this time, but would like to get some additional help for overnight.     Review

## 2023-04-01 NOTE — TELEPHONE ENCOUNTER
On call note:  Patient's daughter Spencer Lopez called and reported that the rozerem was not approved by insurance. Requesting alternative to help her mother Spencer Lopez sleep. Has dementia and does not sleep well at night. Will start mirtazapine 7.5 mg qhs. Advised to watch for fatigue with medication. She will f/u with office on how she tolerated medication over the weekend. Could titrate up on dose in future if needed. Also reports that lidoderm patches are not covered with insurance. Advised that she could obtain the lidocaine 4% patches OTC since insurance would not cover. Spencer Lopez is to call back over the weekend with additional questions.

## 2023-04-04 ENCOUNTER — TELEPHONE (OUTPATIENT)
Dept: FAMILY MEDICINE CLINIC | Age: 88
End: 2023-04-04

## 2023-04-20 ENCOUNTER — TELEPHONE (OUTPATIENT)
Dept: FAMILY MEDICINE CLINIC | Age: 88
End: 2023-04-20

## 2023-04-25 RX ORDER — MIRTAZAPINE 7.5 MG/1
7.5 TABLET, FILM COATED ORAL NIGHTLY
Qty: 30 TABLET | Refills: 0 | Status: SHIPPED | OUTPATIENT
Start: 2023-04-25

## 2023-05-12 RX ORDER — ALBUTEROL SULFATE 90 UG/1
2 AEROSOL, METERED RESPIRATORY (INHALATION) 4 TIMES DAILY PRN
Qty: 18 EACH | Refills: 2 | Status: SHIPPED | OUTPATIENT
Start: 2023-05-12

## 2023-05-17 ENCOUNTER — TELEPHONE (OUTPATIENT)
Dept: FAMILY MEDICINE CLINIC | Age: 88
End: 2023-05-17

## 2023-05-17 DIAGNOSIS — G47.00 INSOMNIA, UNSPECIFIED TYPE: Primary | ICD-10-CM

## 2023-05-23 RX ORDER — TRAZODONE HYDROCHLORIDE 50 MG/1
50 TABLET ORAL NIGHTLY PRN
Qty: 30 TABLET | Refills: 5 | Status: SHIPPED | OUTPATIENT
Start: 2023-05-23 | End: 2023-06-14

## 2023-05-24 ENCOUNTER — OFFICE VISIT (OUTPATIENT)
Dept: FAMILY MEDICINE CLINIC | Age: 88
End: 2023-05-24
Payer: COMMERCIAL

## 2023-05-24 ENCOUNTER — HOSPITAL ENCOUNTER (OUTPATIENT)
Age: 88
Discharge: HOME | End: 2023-05-24
Payer: COMMERCIAL

## 2023-05-24 ENCOUNTER — HOSPITAL ENCOUNTER (OUTPATIENT)
Dept: GENERAL RADIOLOGY | Age: 88
Discharge: HOME | End: 2023-05-24
Payer: COMMERCIAL

## 2023-05-24 VITALS
BODY MASS INDEX: 26.26 KG/M2 | DIASTOLIC BLOOD PRESSURE: 58 MMHG | HEART RATE: 82 BPM | SYSTOLIC BLOOD PRESSURE: 90 MMHG | TEMPERATURE: 98.7 F | OXYGEN SATURATION: 96 % | HEIGHT: 61 IN

## 2023-05-24 DIAGNOSIS — I69.351 HEMIPLEGIA OF RIGHT DOMINANT SIDE AS LATE EFFECT OF CEREBRAL INFARCTION, UNSPECIFIED HEMIPLEGIA TYPE (HCC): ICD-10-CM

## 2023-05-24 DIAGNOSIS — G47.00 INSOMNIA, UNSPECIFIED TYPE: ICD-10-CM

## 2023-05-24 DIAGNOSIS — R60.0 BILATERAL LOWER EXTREMITY EDEMA: Primary | ICD-10-CM

## 2023-05-24 DIAGNOSIS — Z91.81 AT HIGH RISK FOR FALLS: ICD-10-CM

## 2023-05-24 DIAGNOSIS — F03.90 DEMENTIA WITHOUT BEHAVIORAL DISTURBANCE (HCC): ICD-10-CM

## 2023-05-24 DIAGNOSIS — M79.671 RIGHT FOOT PAIN: ICD-10-CM

## 2023-05-24 DIAGNOSIS — I10 HYPERTENSION, ESSENTIAL: ICD-10-CM

## 2023-05-24 DIAGNOSIS — L30.9 DERMATITIS: ICD-10-CM

## 2023-05-24 DIAGNOSIS — E03.9 ACQUIRED HYPOTHYROIDISM: ICD-10-CM

## 2023-05-24 DIAGNOSIS — N18.31 STAGE 3A CHRONIC KIDNEY DISEASE (HCC): ICD-10-CM

## 2023-05-24 PROCEDURE — 73630 X-RAY EXAM OF FOOT: CPT

## 2023-05-24 PROCEDURE — 1036F TOBACCO NON-USER: CPT

## 2023-05-24 PROCEDURE — 99214 OFFICE O/P EST MOD 30 MIN: CPT

## 2023-05-24 PROCEDURE — G8427 DOCREV CUR MEDS BY ELIG CLIN: HCPCS

## 2023-05-24 PROCEDURE — 1123F ACP DISCUSS/DSCN MKR DOCD: CPT

## 2023-05-24 PROCEDURE — G8417 CALC BMI ABV UP PARAM F/U: HCPCS

## 2023-05-24 PROCEDURE — 1090F PRES/ABSN URINE INCON ASSESS: CPT

## 2023-05-24 RX ORDER — DIAPER,BRIEF,INFANT-TODD,DISP
EACH MISCELLANEOUS
Qty: 30 G | Refills: 0 | Status: SHIPPED | OUTPATIENT
Start: 2023-05-24

## 2023-05-24 ASSESSMENT — ENCOUNTER SYMPTOMS
SHORTNESS OF BREATH: 0
ABDOMINAL PAIN: 0

## 2023-05-24 NOTE — PROGRESS NOTES
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

## 2023-05-27 DIAGNOSIS — I10 HYPERTENSION, ESSENTIAL: ICD-10-CM

## 2023-05-30 RX ORDER — FUROSEMIDE 40 MG/1
TABLET ORAL
Qty: 45 TABLET | Refills: 3 | OUTPATIENT
Start: 2023-05-30

## 2023-06-13 ENCOUNTER — TELEPHONE (OUTPATIENT)
Dept: FAMILY MEDICINE CLINIC | Age: 88
End: 2023-06-13

## 2023-06-13 DIAGNOSIS — F03.90 DEMENTIA WITHOUT BEHAVIORAL DISTURBANCE (HCC): ICD-10-CM

## 2023-06-13 DIAGNOSIS — I10 ESSENTIAL HYPERTENSION: ICD-10-CM

## 2023-06-13 DIAGNOSIS — E03.9 ACQUIRED HYPOTHYROIDISM: ICD-10-CM

## 2023-06-14 RX ORDER — DONEPEZIL HYDROCHLORIDE 10 MG/1
TABLET, FILM COATED ORAL
Qty: 90 TABLET | Refills: 3 | Status: SHIPPED | OUTPATIENT
Start: 2023-06-14

## 2023-06-14 RX ORDER — BENAZEPRIL HYDROCHLORIDE 20 MG/1
TABLET ORAL
Qty: 90 TABLET | Refills: 1 | Status: SHIPPED | OUTPATIENT
Start: 2023-06-14

## 2023-06-14 RX ORDER — LEVOTHYROXINE SODIUM 0.07 MG/1
TABLET ORAL
Qty: 90 TABLET | Refills: 0 | Status: SHIPPED | OUTPATIENT
Start: 2023-06-14

## 2023-06-15 NOTE — TELEPHONE ENCOUNTER
I spoke to Tyson, the lab was closed when she had the x-ray done. Will try to get her out next week to get labs done.

## 2023-06-20 DIAGNOSIS — E03.9 ACQUIRED HYPOTHYROIDISM: ICD-10-CM

## 2023-06-20 DIAGNOSIS — R60.0 BILATERAL LOWER EXTREMITY EDEMA: ICD-10-CM

## 2023-06-20 LAB
ALBUMIN SERPL-MCNC: 4.1 G/DL (ref 3.4–5)
ALBUMIN/GLOB SERPL: 1.3 {RATIO} (ref 1.1–2.2)
ALP SERPL-CCNC: 81 U/L (ref 40–129)
ALT SERPL-CCNC: 12 U/L (ref 10–40)
ANION GAP SERPL CALCULATED.3IONS-SCNC: 10 MMOL/L (ref 3–16)
AST SERPL-CCNC: 17 U/L (ref 15–37)
BILIRUB SERPL-MCNC: 0.4 MG/DL (ref 0–1)
BUN SERPL-MCNC: 12 MG/DL (ref 7–20)
CALCIUM SERPL-MCNC: 9.5 MG/DL (ref 8.3–10.6)
CHLORIDE SERPL-SCNC: 103 MMOL/L (ref 99–110)
CO2 SERPL-SCNC: 27 MMOL/L (ref 21–32)
CREAT SERPL-MCNC: 1 MG/DL (ref 0.6–1.2)
GFR SERPLBLD CREATININE-BSD FMLA CKD-EPI: 53 ML/MIN/{1.73_M2}
GLUCOSE SERPL-MCNC: 94 MG/DL (ref 70–99)
POTASSIUM SERPL-SCNC: 4.1 MMOL/L (ref 3.5–5.1)
PROT SERPL-MCNC: 7.2 G/DL (ref 6.4–8.2)
SODIUM SERPL-SCNC: 140 MMOL/L (ref 136–145)
TSH SERPL DL<=0.005 MIU/L-ACNC: 3.24 UIU/ML (ref 0.27–4.2)

## 2023-07-24 RX ORDER — ALBUTEROL SULFATE 90 UG/1
2 AEROSOL, METERED RESPIRATORY (INHALATION) 4 TIMES DAILY PRN
Qty: 8.5 EACH | Refills: 2 | Status: SHIPPED | OUTPATIENT
Start: 2023-07-24

## 2023-07-31 ENCOUNTER — TELEPHONE (OUTPATIENT)
Dept: FAMILY MEDICINE CLINIC | Age: 88
End: 2023-07-31

## 2023-07-31 NOTE — TELEPHONE ENCOUNTER
Not usre if having pain or not. They are keeping her off of it. Said after the x-ray, she was told by podiatrist it was not healing properly, the bone was . Went to podiatrist 6/7/23, he told them bone was shifting and not healing. It could take 6-8 weeks to heal. But with her age it could take twice the time. Should she f/u with podiatrist?   We Ordered  the x-ray to begin with.

## 2023-07-31 NOTE — TELEPHONE ENCOUNTER
DOP called in slatted the pt fracture her foot back in May had some x ray done the pt is still in a wheelchair DOP wants to know should she go back for a follow up x ray?       Please call daughter   Barbara Arellano

## 2023-08-18 ENCOUNTER — TELEPHONE (OUTPATIENT)
Dept: FAMILY MEDICINE CLINIC | Age: 88
End: 2023-08-18

## 2023-08-18 DIAGNOSIS — S92.355D CLOSED NONDISPLACED FRACTURE OF FIFTH METATARSAL BONE OF LEFT FOOT WITH ROUTINE HEALING, SUBSEQUENT ENCOUNTER: Primary | ICD-10-CM

## 2023-08-18 NOTE — TELEPHONE ENCOUNTER
Informed her of this message. Told her to take her for x-ray at any [facility] facility that does x-rays. Any questions call us on Monday.

## 2023-08-18 NOTE — TELEPHONE ENCOUNTER
Get x-ray so we have healed baseline and then can start weight bearing. Do not need to wait to hear results as it is as healed as it is going to get at this time. Order in EMR.

## 2023-08-18 NOTE — TELEPHONE ENCOUNTER
----- Message from Keelystephanie Medina sent at 8/18/2023  1:49 PM EDT -----  Subject: Message to Provider    QUESTIONS  Information for Provider? Jacki Arrieta is calling in wanting to know   if an order for a follow up x-ray on the right foot can be placed or if Pt   can start putting pressure on the foot again.   ---------------------------------------------------------------------------  --------------  Nguyễn KNOX  [phone]; OK to leave message on voicemail  ---------------------------------------------------------------------------  --------------  SCRIPT ANSWERS  Relationship to Patient? Other/Third Party  Representative Name? SEAN  Is the representative on the Communication Release of Information (REMIGIO)   form in Epic?  Yes

## 2023-09-08 ENCOUNTER — TELEPHONE (OUTPATIENT)
Dept: FAMILY MEDICINE CLINIC | Age: 88
End: 2023-09-08

## 2023-09-08 NOTE — TELEPHONE ENCOUNTER
DOP calling in, pt fell again and hurt the same foot she did in august.    Per Sofia Long gave DOP info for Ortho -   [phone] ortho after hours [address] [facility], [address]    DOP will call and see if there are any open appts at ortho clinic tomorrow.     SIVAKUMAR

## 2023-09-09 DIAGNOSIS — E03.9 ACQUIRED HYPOTHYROIDISM: ICD-10-CM

## 2023-09-11 RX ORDER — LEVOTHYROXINE SODIUM 0.07 MG/1
TABLET ORAL
Qty: 90 TABLET | Refills: 1 | Status: SHIPPED | OUTPATIENT
Start: 2023-09-11

## 2023-09-21 ENCOUNTER — TELEPHONE (OUTPATIENT)
Dept: FAMILY MEDICINE CLINIC | Age: 88
End: 2023-09-21

## 2023-09-21 NOTE — TELEPHONE ENCOUNTER
----- Message from Long Salvador sent at 9/21/2023  4:15 PM EDT -----  Subject: Message to Provider    QUESTIONS  Information for Provider? Patient daughter Serafin Álvarez) is calling to find   out if the provider needs to see patient once per year if health or twice   a year. Patients daughter has called [employer] and Jorge mcmillan said it   is up to provider. Please advise  ---------------------------------------------------------------------------  --------------  Anastasia Carrizales INFO  [phone]; OK to leave message on voicemail  ---------------------------------------------------------------------------  --------------  SCRIPT ANSWERS  Relationship to Patient? Other/Third Party  Representative Name? Serafin Álvarez- Daughter  Is the representative on the Communication Release of Information (REMIGIO)   form in Epic?  Yes

## 2023-10-18 ENCOUNTER — TELEPHONE (OUTPATIENT)
Dept: FAMILY MEDICINE CLINIC | Age: 88
End: 2023-10-18

## 2023-10-19 NOTE — TELEPHONE ENCOUNTER
Received a call from Nicolasa's daughter reporting concerns of patient being more lethargic and having less UOP- does she need to be seen in ER? DOP does not have any way to transport her to ER if so. I advised she should call 911 if patient is having altered mental status so that she can be brought to ER for evaluation    DOP prefers to wait and see how patient is when she wakes up for the day. I stressed that patient should be seen if having mental status changes. Received a follow up call from DOP reporting that patient has been much improved and perked up once she was able to get some breakfast, and she also had a large amount of urine in her depends. DOP no longer concerned about dehydration and mental status changes. She also reports that patient received a full dose of trazodone the night prior (had been getting just a half dose), so this is probably why patient seemed more lethargic. I advised DOP that current report on patient status is reassuring, but if she develops any concerning symptoms, she should be seen and evaluated in ER. DOP verbalized understanding and had no further concerns.

## 2023-11-07 ENCOUNTER — OFFICE VISIT (OUTPATIENT)
Dept: FAMILY MEDICINE CLINIC | Age: 88
End: 2023-11-07
Payer: COMMERCIAL

## 2023-11-07 VITALS
HEIGHT: 61 IN | HEART RATE: 97 BPM | DIASTOLIC BLOOD PRESSURE: 62 MMHG | BODY MASS INDEX: 26.26 KG/M2 | SYSTOLIC BLOOD PRESSURE: 110 MMHG | OXYGEN SATURATION: 97 %

## 2023-11-07 DIAGNOSIS — F03.90 DEMENTIA WITHOUT BEHAVIORAL DISTURBANCE (HCC): ICD-10-CM

## 2023-11-07 DIAGNOSIS — R53.81 PHYSICAL DECONDITIONING: ICD-10-CM

## 2023-11-07 DIAGNOSIS — L98.421 SKIN ULCER OF SACRUM, LIMITED TO BREAKDOWN OF SKIN (HCC): ICD-10-CM

## 2023-11-07 DIAGNOSIS — S92.355D CLOSED NONDISPLACED FRACTURE OF FIFTH METATARSAL BONE OF LEFT FOOT WITH ROUTINE HEALING, SUBSEQUENT ENCOUNTER: ICD-10-CM

## 2023-11-07 DIAGNOSIS — I69.351 HEMIPLEGIA OF RIGHT DOMINANT SIDE AS LATE EFFECT OF CEREBRAL INFARCTION, UNSPECIFIED HEMIPLEGIA TYPE (HCC): ICD-10-CM

## 2023-11-07 DIAGNOSIS — I10 HYPERTENSION, ESSENTIAL: Primary | ICD-10-CM

## 2023-11-07 DIAGNOSIS — E03.9 ACQUIRED HYPOTHYROIDISM: ICD-10-CM

## 2023-11-07 DIAGNOSIS — Z91.81 AT HIGH RISK FOR FALLS: ICD-10-CM

## 2023-11-07 PROCEDURE — 1036F TOBACCO NON-USER: CPT

## 2023-11-07 PROCEDURE — G8427 DOCREV CUR MEDS BY ELIG CLIN: HCPCS

## 2023-11-07 PROCEDURE — 1090F PRES/ABSN URINE INCON ASSESS: CPT

## 2023-11-07 PROCEDURE — 99214 OFFICE O/P EST MOD 30 MIN: CPT

## 2023-11-07 PROCEDURE — G8417 CALC BMI ABV UP PARAM F/U: HCPCS

## 2023-11-07 PROCEDURE — 1123F ACP DISCUSS/DSCN MKR DOCD: CPT

## 2023-11-07 PROCEDURE — G8484 FLU IMMUNIZE NO ADMIN: HCPCS

## 2023-11-07 ASSESSMENT — ENCOUNTER SYMPTOMS
SHORTNESS OF BREATH: 0
ABDOMINAL PAIN: 0

## 2023-11-07 NOTE — PROGRESS NOTES
11/7/2023    This is a 80 y.o. female   Chief Complaint   Patient presents with    Thyroid Problem     6 mo f/u - will do AWV in a few weeks virtual visit, not due till after 11/8/23. Brooklyn Estevez HPI    Here today with her daughter and other daughter on the phone    Has a spot on her bottom- dry, but keeps getting bigger  Applying a topical paste  Sits in a wheelchair and a reclining chair during the day  Has egg crate in chair, but she does not get up much    Has a metatarsal fracture- this is the second time she has fractured her 5th metatarsal  Follows with podiatry  Wearing surgical shoe  Has lost a lot of muscle due to inactivity  Unable to independently move about the house    Has some large lumps in her breasts  10 years ago had discharge from nipples  Thought it was likely cancer at that time- daughter feels like the lumps have gotten bigger  Rich Crawford has declined further workup for this    Insomnia- taking 50 mg trazodone nightly which has been effective  Doesn't always sleep well, but the medication has been keeping her relaxed at night.     Hypothyroidism: taking 75 mg levothyroxine; last TSH 6/2023 3.24    Dementia: on Aricept 10 mg daily     Patient Active Problem List   Diagnosis    Hypertension, essential    Hypothyroidism    Hemiplegia, late effect of cerebrovascular disease ([address])- R leg foot drop    Osteopenia- DXA -2.2 (7/14), -1.5 (4/11), -1.8 (8/08)    GERD (gastroesophageal reflux disease)    Chronic low back pain    Spondylosis, cervical    Aortic sclerosis- 2/6 KANCHAN, NL ECHO    Glaucoma    Retinal detachment    Constipation    Hyperlipidemia with target LDL less than 130 given age    Allergic Conjunctivitis    History of CVA (cerebrovascular accident)    Urge incontinence    Osteoarthritis of lumbar spine    History of compression fracture of spine    Osteoarthritis of spine    Lumbar disc herniation    Dementia without behavioral disturbance (HCC)    Urinary frequency    Needs flu shot- declines

## 2023-11-13 ENCOUNTER — TELEPHONE (OUTPATIENT)
Dept: FAMILY MEDICINE CLINIC | Age: 88
End: 2023-11-13

## 2023-11-13 NOTE — TELEPHONE ENCOUNTER
Called and gave verbal for wound care. Bridger Graham also stated that as SIVAKUMAR the Albuterol flags with her diabetic eye drops. Bridger Graham stated that the patient has not had a reaction and will keep us posted.

## 2023-11-13 NOTE — TELEPHONE ENCOUNTER
May we have a verbal for the patient to be seen at the wound care center for once a week for 4-5 weeks please?

## 2023-11-13 NOTE — TELEPHONE ENCOUNTER
Wess Goldberg calling in from [facility] in, She saw pt for wound care over the weekend and the wound is closed. Would like a Verbal for SN to see pt once a week for 4 to 5 weeks.     Please advise  Wess Goldberg can be reached at [phone]

## 2023-11-15 ENCOUNTER — TELEPHONE (OUTPATIENT)
Dept: FAMILY MEDICINE CLINIC | Age: 88
End: 2023-11-15

## 2023-11-15 DIAGNOSIS — N30.00 ACUTE CYSTITIS WITHOUT HEMATURIA: Primary | ICD-10-CM

## 2023-11-15 LAB
BACTERIA URNS QL MICRO: ABNORMAL /HPF
BILIRUB UR QL STRIP.AUTO: ABNORMAL
CHARACTER UR: ABNORMAL
CLARITY UR: ABNORMAL
COLOR UR: ABNORMAL
EPI CELLS #/AREA URNS AUTO: 20 /HPF (ref 0–5)
GLUCOSE UR STRIP.AUTO-MCNC: NEGATIVE MG/DL
HGB UR QL STRIP.AUTO: NEGATIVE
HYALINE CASTS #/AREA URNS AUTO: 5 /LPF (ref 0–8)
KETONES UR STRIP.AUTO-MCNC: NEGATIVE MG/DL
LEUKOCYTE ESTERASE UR QL STRIP.AUTO: ABNORMAL
NITRITE UR QL STRIP.AUTO: POSITIVE
PH UR STRIP.AUTO: 5 [PH] (ref 5–8)
PROT UR STRIP.AUTO-MCNC: 100 MG/DL
RBC #/AREA URNS HPF: ABNORMAL /HPF (ref 0–4)
SP GR UR STRIP.AUTO: 1.03 (ref 1–1.03)
UA DIPSTICK W REFLEX MICRO PNL UR: YES
URN SPEC COLLECT METH UR: ABNORMAL
UROBILINOGEN UR STRIP-ACNC: 1 E.U./DL
WBC #/AREA URNS HPF: ABNORMAL /HPF (ref 0–5)

## 2023-11-15 RX ORDER — CIPROFLOXACIN 250 MG/1
250 TABLET, FILM COATED ORAL 2 TIMES DAILY
Qty: 20 TABLET | Refills: 0 | Status: SHIPPED | OUTPATIENT
Start: 2023-11-15 | End: 2023-11-25

## 2023-11-15 NOTE — TELEPHONE ENCOUNTER
Her therapist thinks she might have a UTI  Would like to know if Gifty from [facility] can collect a urine sample when she goes and she's her this afternoon between 130-2  Low grade fever  Vitals are fine  She is feeling weak    Please advise if she can collect that urine sample

## 2023-11-16 LAB — BACTERIA UR CULT: NORMAL

## 2023-12-06 ENCOUNTER — TELEPHONE (OUTPATIENT)
Dept: FAMILY MEDICINE CLINIC | Age: 88
End: 2023-12-06

## 2023-12-13 DIAGNOSIS — G47.00 INSOMNIA, UNSPECIFIED TYPE: ICD-10-CM

## 2023-12-13 DIAGNOSIS — I10 ESSENTIAL HYPERTENSION: ICD-10-CM

## 2023-12-13 RX ORDER — BENAZEPRIL HYDROCHLORIDE 20 MG/1
TABLET ORAL
Qty: 90 TABLET | Refills: 1 | OUTPATIENT
Start: 2023-12-13

## 2023-12-13 RX ORDER — TRAZODONE HYDROCHLORIDE 50 MG/1
50 TABLET ORAL NIGHTLY PRN
Qty: 90 TABLET | Refills: 1 | OUTPATIENT
Start: 2023-12-13

## 2023-12-13 RX ORDER — TRAZODONE HYDROCHLORIDE 50 MG/1
50 TABLET ORAL DAILY PRN
Qty: 30 TABLET | Refills: 5 | Status: SHIPPED | OUTPATIENT
Start: 2023-12-13

## 2023-12-18 ENCOUNTER — TELEPHONE (OUTPATIENT)
Dept: FAMILY MEDICINE CLINIC | Age: 88
End: 2023-12-18

## 2023-12-18 NOTE — TELEPHONE ENCOUNTER
New referral has been faxed to [facility] and DOP has been notified.    daily 8/8/19   NILAM Busby CNP   venlafaxine (EFFEXOR XR) 150 MG extended release capsule Take 1 capsule by mouth daily 8/8/19   NILAM Busby CNP   LACTULOSE PO Take by mouth 2 times daily     Historical Provider, MD   melatonin 1 MG/4ML LIQD SL liquid Place 0.3 mg under the tongue nightly as needed for Sleep    Historical Provider, MD   glucose monitoring kit (FREESTYLE) monitoring kit Glucometer with test strips and lancets. Check BS once daily  #100 strips and lancets 10/4/18   NILAM Busby CNP   blood glucose test strips (ASCENSIA AUTODISC VI;ONE TOUCH ULTRA TEST VI) strip Test as needed. Dispense One Touch verio Test Strips. Dx: Type 2 diabetes (250.00) 10/4/18   NILAM Orta CNP       Future Appointments   Date Time Provider Amanda Jerome   5/1/2020  2:00 PM NILAM Young CNP [facility]     ,   Diabetes Assessment    Meal Planning:  Avoidance of concentrated sweets   How often do you test your blood sugar?:  No Testing (Comment: monitors BS approx 1 x a week )   Do you have barriers with adherence to non-pharmacologic self-management interventions?  (Nutrition/Exercise/Self-Monitoring):  No   Have you ever had to go to the ED for symptoms of low blood sugar?:  No       No patient-reported symptoms      ,   COPD Assessment    Is the patient able to verbalize Rescue vs. Long Acting medications?:  Yes  Does the patient have a nebulizer?:  No  Does the patient use a space with inhaled medications?:  No     No patient-reported symptoms         Symptoms:      Symptom course:  stable  Increase use of rapid acting/rescue inhaled medications?:  No  Change in chronic cough?:  No/At Baseline  Change in sputum?:  No/At Baseline      and   General Assessment    Do you have any symptoms that are causing concern?:  No

## 2023-12-18 NOTE — TELEPHONE ENCOUNTER
DOP called stating that her Mom was having a nurse come to the home for her pressure wound on her bottom. She said last week was the nurse's last week, however she did not check her bottom.    She would like to get another nurse to come back out to check her pressure wound and continue care    Please call daughter Nicolasa back

## 2023-12-20 ENCOUNTER — TELEPHONE (OUTPATIENT)
Dept: FAMILY MEDICINE CLINIC | Age: 88
End: 2023-12-20

## 2023-12-20 NOTE — TELEPHONE ENCOUNTER
Benita from [facility] Called requesting a VO for this patient to get SN once a week for 2 weeks and then every other week for the following 6 weeks     She is also requesting medihoney to be applied for her wound care     Benita can be reached at [phone]    Please Advise

## 2023-12-29 ENCOUNTER — TELEPHONE (OUTPATIENT)
Dept: FAMILY MEDICINE CLINIC | Age: 88
End: 2023-12-29

## 2023-12-29 DIAGNOSIS — R53.81 PHYSICAL DECONDITIONING: ICD-10-CM

## 2023-12-29 DIAGNOSIS — L98.421 SKIN ULCER OF SACRUM, LIMITED TO BREAKDOWN OF SKIN (HCC): ICD-10-CM

## 2023-12-29 DIAGNOSIS — F03.90 DEMENTIA WITHOUT BEHAVIORAL DISTURBANCE (HCC): Primary | ICD-10-CM

## 2023-12-29 DIAGNOSIS — I69.351 HEMIPLEGIA OF RIGHT DOMINANT SIDE AS LATE EFFECT OF CEREBRAL INFARCTION, UNSPECIFIED HEMIPLEGIA TYPE (HCC): ICD-10-CM

## 2023-12-29 DIAGNOSIS — Z86.73 HISTORY OF CVA (CEREBROVASCULAR ACCIDENT): ICD-10-CM

## 2023-12-29 DIAGNOSIS — R53.1 WEAKNESS: ICD-10-CM

## 2023-12-29 NOTE — TELEPHONE ENCOUNTER
DOP called, she spoke with hospice [facility] today regarding pt. They are referring pt to set up hospice care and need a referral from PCP     [facility] can be reached [phone]    They did not give dop a fax number to send a referral too    Please advise  DOP can be reached at [phone]

## 2023-12-29 NOTE — TELEPHONE ENCOUNTER
Called DOP, advised hospice referral has been faxed.     DOP stated there are no new diagnoses or problems for the patient

## 2023-12-29 NOTE — TELEPHONE ENCOUNTER
Order in EMR.   Please fax over to [facility]  Let family know.   Any new diagnoses or problems?

## 2024-01-01 ENCOUNTER — TELEPHONE (OUTPATIENT)
Dept: FAMILY MEDICINE CLINIC | Age: 88
End: 2024-01-01

## 2024-01-01 DIAGNOSIS — I10 ESSENTIAL HYPERTENSION: Primary | ICD-10-CM

## 2024-01-01 RX ORDER — BENAZEPRIL HYDROCHLORIDE 10 MG/1
10 TABLET ORAL DAILY
Qty: 30 TABLET | Refills: 3 | Status: SHIPPED | OUTPATIENT
Start: 2024-01-01 | End: 2025-01-10

## 2024-01-17 ENCOUNTER — TELEPHONE (OUTPATIENT)
Dept: FAMILY MEDICINE CLINIC | Age: 89
End: 2024-01-17

## 2024-01-17 NOTE — TELEPHONE ENCOUNTER
Had frequent premature atrial contractions on her last EKG in about 18 months ago. These are benign. May be what she is hearing now.     Not urgent unless tachycardia, abnormal blood pressure or not feeling well.     She is due for AWV. Could we schedule that in Feb or March please.

## 2024-01-17 NOTE — TELEPHONE ENCOUNTER
Benita from [facility] called wanting to let Dr. Tom know that she was with this patient today and she noticed her heart skipping beats every so often    No other symptoms    Benita can be reached at [phone]

## 2024-02-01 ENCOUNTER — TELEPHONE (OUTPATIENT)
Dept: FAMILY MEDICINE CLINIC | Age: 89
End: 2024-02-01

## 2024-02-01 NOTE — TELEPHONE ENCOUNTER
Carolyne from [facility] will be sending over a fax today for Dr. Tom to sign concerning this patient    She would like it faxed back today if possible    Carolyne can be reached [phone]

## 2024-03-11 DIAGNOSIS — E03.9 ACQUIRED HYPOTHYROIDISM: ICD-10-CM

## 2024-03-11 RX ORDER — LEVOTHYROXINE SODIUM 0.07 MG/1
TABLET ORAL
Qty: 90 TABLET | Refills: 1 | Status: SHIPPED | OUTPATIENT
Start: 2024-03-11

## 2024-04-08 ENCOUNTER — TELEPHONE (OUTPATIENT)
Dept: FAMILY MEDICINE CLINIC | Age: 89
End: 2024-04-08

## 2024-04-08 ENCOUNTER — TELEMEDICINE (OUTPATIENT)
Dept: FAMILY MEDICINE CLINIC | Age: 89
End: 2024-04-08
Payer: COMMERCIAL

## 2024-04-08 DIAGNOSIS — Z00.00 MEDICARE ANNUAL WELLNESS VISIT, SUBSEQUENT: Primary | ICD-10-CM

## 2024-04-08 DIAGNOSIS — I10 HYPERTENSION, ESSENTIAL: ICD-10-CM

## 2024-04-08 DIAGNOSIS — G47.00 INSOMNIA, UNSPECIFIED TYPE: ICD-10-CM

## 2024-04-08 DIAGNOSIS — E03.9 ACQUIRED HYPOTHYROIDISM: ICD-10-CM

## 2024-04-08 PROCEDURE — G0439 PPPS, SUBSEQ VISIT: HCPCS

## 2024-04-08 PROCEDURE — 1123F ACP DISCUSS/DSCN MKR DOCD: CPT

## 2024-04-08 RX ORDER — TRAZODONE HYDROCHLORIDE 50 MG/1
100 TABLET ORAL DAILY PRN
Qty: 60 TABLET | Refills: 5
Start: 2024-04-08 | End: 2024-04-08 | Stop reason: SDUPTHER

## 2024-04-08 RX ORDER — TRAZODONE HYDROCHLORIDE 50 MG/1
100 TABLET ORAL DAILY PRN
Qty: 60 TABLET | Refills: 5 | Status: SHIPPED | OUTPATIENT
Start: 2024-04-08

## 2024-04-08 ASSESSMENT — PATIENT HEALTH QUESTIONNAIRE - PHQ9
1. LITTLE INTEREST OR PLEASURE IN DOING THINGS: NOT AT ALL
2. FEELING DOWN, DEPRESSED OR HOPELESS: NOT AT ALL
SUM OF ALL RESPONSES TO PHQ QUESTIONS 1-9: 0
SUM OF ALL RESPONSES TO PHQ9 QUESTIONS 1 & 2: 0
SUM OF ALL RESPONSES TO PHQ QUESTIONS 1-9: 0

## 2024-04-08 NOTE — PATIENT INSTRUCTIONS

## 2024-04-08 NOTE — PROGRESS NOTES
Medicare Annual Wellness Visit    Nicolasa Malone is here for Medicare AWV (AWV, under Hospice care, [facility] her team is at [facility]/Not sleeping at night, but sleeps all day./)    Assessment & Plan   Medicare annual wellness visit, subsequent  Risk factor screening and interventions as below  Hypertension, essential  No longer on medications due to hypotension  Insomnia, unspecified type  -     traZODone (DESYREL) 50 MG tablet; Take 2 tablets by mouth daily as needed for Sleep, Disp-60 tablet, R-5Normal  Trazodone 75 mg ineffective- increase to 100 mg nightly  Discussed need for safety precautions to prevent falls  Acquired hypothyroidism  Stable on current dose of levothyroxine    Recommendations for Preventive Services Due: see orders and patient instructions/AVS.  Recommended screening schedule for the next 5-10 years is provided to the patient in written form: see Patient Instructions/AVS.     No follow-ups on file.     Subjective   The following acute and/or chronic problems were also addressed today:    Now in [facility]- started in February  Nurse coming to house every Friday   has been out twice    Sleep: trazodone has been increased to 75 mg nightly     Patient's complete Health Risk Assessment and screening values have been reviewed and are found in Flowsheets. The following problems were reviewed today and where indicated follow up appointments were made and/or referrals ordered.    Positive Risk Factor Screenings with Interventions:    Fall Risk:  Do you feel unsteady or are you worried about falling? : (!) yes  2 or more falls in past year?: (!) yes  Fall with injury in past year?: (!) yes (fx of foot)     Interventions:    Patient comments: Has 24 hour care in her home  Patient declines any further evaluation or treatment             Activity, Diet, and Weight:  On average, how many days per week do you engage in moderate to strenuous exercise (like a brisk

## 2024-04-09 NOTE — TELEPHONE ENCOUNTER
The PM is just benadryl (diphenhydramine). She can try 25 mg at bedtime bed watch for it leaving her hung over in morning. It can also increase her risk of falling due to sedation.

## 2024-06-11 DIAGNOSIS — I10 ESSENTIAL HYPERTENSION: ICD-10-CM

## 2024-06-11 DIAGNOSIS — F03.90 DEMENTIA WITHOUT BEHAVIORAL DISTURBANCE (HCC): ICD-10-CM

## 2024-06-11 DIAGNOSIS — G47.00 INSOMNIA, UNSPECIFIED TYPE: ICD-10-CM

## 2024-06-11 RX ORDER — DONEPEZIL HYDROCHLORIDE 10 MG/1
TABLET, FILM COATED ORAL
Qty: 90 TABLET | Refills: 3 | Status: SHIPPED | OUTPATIENT
Start: 2024-06-11

## 2024-06-11 RX ORDER — TRAZODONE HYDROCHLORIDE 50 MG/1
TABLET ORAL
Qty: 90 TABLET | Refills: 1 | Status: SHIPPED | OUTPATIENT
Start: 2024-06-11

## 2024-06-11 RX ORDER — BENAZEPRIL HYDROCHLORIDE 20 MG/1
TABLET ORAL
Qty: 90 TABLET | Refills: 1 | OUTPATIENT
Start: 2024-06-11

## 2024-06-26 DIAGNOSIS — E03.9 ACQUIRED HYPOTHYROIDISM: ICD-10-CM

## 2024-06-26 DIAGNOSIS — F03.90 DEMENTIA WITHOUT BEHAVIORAL DISTURBANCE (HCC): ICD-10-CM

## 2024-06-26 DIAGNOSIS — I10 ESSENTIAL HYPERTENSION: ICD-10-CM

## 2024-06-26 RX ORDER — BENAZEPRIL HYDROCHLORIDE 20 MG/1
TABLET ORAL
Qty: 90 TABLET | Refills: 1 | OUTPATIENT
Start: 2024-06-26

## 2024-06-26 RX ORDER — DONEPEZIL HYDROCHLORIDE 10 MG/1
10 TABLET, FILM COATED ORAL NIGHTLY
Qty: 90 TABLET | Refills: 3 | Status: SHIPPED | OUTPATIENT
Start: 2024-06-26

## 2024-06-26 RX ORDER — LEVOTHYROXINE SODIUM 0.07 MG/1
75 TABLET ORAL DAILY
Qty: 90 TABLET | Refills: 1 | Status: SHIPPED | OUTPATIENT
Start: 2024-06-26

## 2024-07-22 ENCOUNTER — TELEPHONE (OUTPATIENT)
Dept: FAMILY MEDICINE CLINIC | Age: 89
End: 2024-07-22

## 2024-08-18 NOTE — LETTER
Hospital Discharge Summary    Patient's PCP: Kayden Acosta MD  Admit Date: 8/17/2024   Discharge Date: 8/18/2024    Admitting Physician: Dr. Jerry Goddard MD  Discharge Physician: Dr. Juan C Tobar MD   Consults: none    HPI:     43 y.o. female with insulin-dependent diabetes, hypertension, syncope in 2020 who presented to [facility] with altered mental status.     Was at her son's football game, was sitting in a chair, and was found unconscious by her significant other after he returned from helping the football team.  He was concerned her glucose was low and gave her a small glucose tablet, but her sugar was not able to be checked at that time.  She was reportedly unconscious for approximately 25 minutes after she was found up until around the time of EMS arrival.       States she did take her insulin shortly prior to going to the football game, did not take it with her morning meal, but does this frequently.  No recent changes to her insulin dosing. Patient states her sugars typically run in the 3-400s.     She had a glucose in the 150s for EMS and was gradually awakening and becoming more responsive upon transition to the EMS stretcher and ambulance.      She denies any recent falls or injuries.  Does not remember the event. Was not incontinent, no tongue injury.  Has had prior episodes of hypoglycemia where she has been confused but never unconscious for a significant period of time.     She was admitted in 2022 for \"1-2 episodes of near syncope and then 1 episode of syncope in the parking lot\" At that time, work up for syncope was negative and was felt \"...likely a result of not eating and drinking well and loosing fluid through polyuria from uncontrolled diabetes\". Reviewed echo done at that time which looked fairly normal.      In the ED, she was noted with significant bilateral lower abdominal pain. No recent cough, congestion, nausea/vomiting, diarrhea, or dysuria.      In the  [address] [facility]  Phone: [phone]  Fax: [phone]    Lia Stark MD        March 15, 2021     Patient: Safia Pedro   YOB: 1931   Date: 3/15/2021       To Whom It May Concern: It is my medical opinion that Safia Pedro requires care from her daughter, David Tran. Therefore, David Tran is unable to perform jury duty at this time. If you have any questions or concerns, please don't hesitate to call.     Sincerely,         Lia Stark MD  --      No results for input(s): \"INR\" in the last 72 hours.      Discharge Medications:  Discharge Medication List as of 8/18/2024  6:57 PM             Details   aspirin 81 MG chewable tablet Take 1 tablet by mouth daily, Disp-30 tablet, R-3Normal                Details   atorvastatin (LIPITOR) 80 MG tablet Take 1 tablet by mouth nightly, Disp-30 tablet, R-3Normal                Details   methIMAzole (TAPAZOLE) 10 MG tablet Take 1 tablet by mouth 2 times daily (before meals)Historical Med      lithium (ESKALITH) 450 MG extended release tablet Take 1 tablet by mouth 2 times dailyHistorical Med      propranolol (INDERAL) 20 MG tablet Take 1 tablet by mouth in the morning, at noon, in the evening, and at bedtimeHistorical Med      insulin lispro, 1 Unit Dial, (HUMALOG/ADMELOG) 100 UNIT/ML SOPN Inject 30 Units into the skin 3 times daily (with meals)Historical Med           Activity: Left AMA  Diet: Advised on diabetic diet, low cholesterol diet  Wound Care: none needed    Disposition: Left AMA with     Discharged Condition: Left AMA    Follow Up: Advised to follow up ASAP with Primary Care Physician     Total time spent on discharge, finalizing medications, referrals and arranging outpatient follow up was more than 20 minutes    Thank you Kayden Flor MD for the opportunity to be involved in this patients care. If you have any questions or concerns please feel free to contact me at [phone].

## 2024-08-28 ENCOUNTER — TELEPHONE (OUTPATIENT)
Dept: FAMILY MEDICINE CLINIC | Age: 89
End: 2024-08-28

## 2024-08-28 NOTE — TELEPHONE ENCOUNTER
Alirio from [facility] called requesting an order for this patient for respite care at a nursing home from 8/30-9-3-24    He would also like to know if Dr. Tom will follow this patient or if she wants the attending physician on site to follow this patient    Alirio can be reached at [phone]    Please Advise

## 2024-09-04 ENCOUNTER — TELEPHONE (OUTPATIENT)
Dept: FAMILY MEDICINE CLINIC | Age: 89
End: 2024-09-04

## 2024-09-04 NOTE — TELEPHONE ENCOUNTER
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

## 2024-09-05 ENCOUNTER — TELEPHONE (OUTPATIENT)
Dept: FAMILY MEDICINE CLINIC | Age: 89
End: 2024-09-05

## 2024-09-05 DIAGNOSIS — F51.04 PSYCHOPHYSIOLOGICAL INSOMNIA: Primary | ICD-10-CM

## 2024-09-05 DIAGNOSIS — B20: ICD-10-CM

## 2024-09-05 DIAGNOSIS — F02.818: ICD-10-CM

## 2024-09-05 DIAGNOSIS — F03.90 DEMENTIA WITHOUT BEHAVIORAL DISTURBANCE (HCC): ICD-10-CM

## 2024-09-05 DIAGNOSIS — F03.918 DEMENTIA WITH BEHAVIORAL DISTURBANCE (HCC): ICD-10-CM

## 2024-09-05 RX ORDER — QUETIAPINE FUMARATE 25 MG/1
25 TABLET, FILM COATED ORAL NIGHTLY
Qty: 30 TABLET | Refills: 1 | Status: SHIPPED | OUTPATIENT
Start: 2024-09-05

## 2024-10-08 DIAGNOSIS — E03.9 ACQUIRED HYPOTHYROIDISM: ICD-10-CM

## 2024-10-08 DIAGNOSIS — Z51.5 HOSPICE CARE: Primary | ICD-10-CM

## 2024-10-09 PROBLEM — Z51.5 HOSPICE CARE: Status: ACTIVE | Noted: 2024-10-09

## 2024-10-09 RX ORDER — LEVOTHYROXINE SODIUM 75 UG/1
75 TABLET ORAL DAILY
Qty: 90 TABLET | Refills: 1 | Status: SHIPPED | OUTPATIENT
Start: 2024-10-09

## 2024-12-13 NOTE — TELEPHONE ENCOUNTER
Alirio from [facility]   Bp has been running high for the past 6 weeks  Close to 160s/90 range  Was taken off bp medication due to stroke rist  Family would like for the pt to be put back on bp medication   Benazepril 20mg daily    Please advise   
Informed flo Rx was sent in for pt   
JAVAN rossi to call back   Informed DOP  
Tell OK to resume benazepril but start at 10 mg and see how that dose for a a week or 2.  
insulin, senna, miralax, ferrous sulfate, potassium chloride

## 2025-01-08 ENCOUNTER — TELEPHONE (OUTPATIENT)
Dept: FAMILY MEDICINE CLINIC | Age: 89
End: 2025-01-08